# Patient Record
Sex: MALE | Race: WHITE | Employment: UNEMPLOYED | URBAN - METROPOLITAN AREA
[De-identification: names, ages, dates, MRNs, and addresses within clinical notes are randomized per-mention and may not be internally consistent; named-entity substitution may affect disease eponyms.]

---

## 2022-08-08 ENCOUNTER — OFFICE VISIT (OUTPATIENT)
Dept: SURGERY | Age: 27
End: 2022-08-08
Payer: COMMERCIAL

## 2022-08-08 VITALS
SYSTOLIC BLOOD PRESSURE: 126 MMHG | WEIGHT: 260 LBS | TEMPERATURE: 97.9 F | BODY MASS INDEX: 36.4 KG/M2 | RESPIRATION RATE: 18 BRPM | HEART RATE: 84 BPM | DIASTOLIC BLOOD PRESSURE: 82 MMHG | OXYGEN SATURATION: 98 % | HEIGHT: 71 IN

## 2022-08-08 DIAGNOSIS — K80.20 CALCULUS OF GALLBLADDER WITHOUT CHOLECYSTITIS WITHOUT OBSTRUCTION: Primary | ICD-10-CM

## 2022-08-08 PROCEDURE — 99204 OFFICE O/P NEW MOD 45 MIN: CPT | Performed by: COLON & RECTAL SURGERY

## 2022-08-08 RX ORDER — LISINOPRIL 10 MG/1
10 TABLET ORAL DAILY
COMMUNITY

## 2022-08-08 RX ORDER — PANTOPRAZOLE SODIUM 40 MG/1
40 TABLET, DELAYED RELEASE ORAL DAILY
COMMUNITY

## 2022-08-08 RX ORDER — ALBUTEROL SULFATE 90 UG/1
AEROSOL, METERED RESPIRATORY (INHALATION)
COMMUNITY

## 2022-08-08 RX ORDER — VENLAFAXINE 75 MG/1
75 TABLET ORAL 2 TIMES DAILY
COMMUNITY

## 2022-08-08 RX ORDER — GLECAPREVIR AND PIBRENTASVIR 40; 100 MG/1; MG/1
3 TABLET, FILM COATED ORAL
COMMUNITY

## 2022-08-08 NOTE — H&P (VIEW-ONLY)
OFFICE VISIT NOTE    Mel Sandoval is a 27 y.o. male who presents to the office today for:    Chief Complaint   Patient presents with    New Patient     Abdominal Pain        The patient is a 49-year-old gentleman who comes in today from [facility] where he is currently incarcerated with a complaint of right upper quadrant pain. He has had pain in the right upper quadrant for a couple of months which she describes as intermittent and associated with eating. Described as a dull ache which radiates to the back. Lasts anywhere from 20 to 30 minutes to a couple of hours. He did have an ultrasound recently which demonstrated cholelithiasis. His past medical history seen for history of hepatitis C for which she states he is penitentiary through with therapy. He also has a history of hypertension and GERD.       Past Medical History:   Diagnosis Date    GERD (gastroesophageal reflux disease)     Hypertension        Past Surgical History:   Procedure Laterality Date    HX TONSILLECTOMY         Family History   Family history unknown: Yes       Social History     Socioeconomic History    Marital status: UNKNOWN     Spouse name: Not on file    Number of children: Not on file    Years of education: Not on file    Highest education level: Not on file   Occupational History    Not on file   Tobacco Use    Smoking status: Former     Packs/day: 1.00     Years: 12.00     Pack years: 12.00     Types: Cigarettes    Smokeless tobacco: Not on file   Vaping Use    Vaping Use: Never used   Substance and Sexual Activity    Alcohol use: Not Currently    Drug use: Not Currently     Types: Heroin    Sexual activity: Not on file   Other Topics Concern    Not on file   Social History Narrative    Not on file     Social Determinants of Health     Financial Resource Strain: Not on file   Food Insecurity: Not on file Transportation Needs: Not on file   Physical Activity: Not on file   Stress: Not on file   Social Connections: Not on file   Intimate Partner Violence: Not on file   Housing Stability: Not on file       Allergies   Allergen Reactions    Ibuprofen Unknown (comments)    Rocephin [Ceftriaxone] Unknown (comments)       Current Outpatient Medications   Medication Sig    glecaprevir-pibrentasvir (Mavyret) 100-40 mg tab Take 3 Tablets by mouth nightly. pantoprazole (PROTONIX) 40 mg tablet Take 40 mg by mouth in the morning. venlafaxine (EFFEXOR) 75 mg tablet Take 75 mg by mouth two (2) times a day. lisinopriL (PRINIVIL, ZESTRIL) 10 mg tablet Take 10 mg by mouth in the morning. albuterol (PROVENTIL HFA, VENTOLIN HFA, PROAIR HFA) 90 mcg/actuation inhaler Take  by inhalation. No current facility-administered medications for this visit. Review of Systems   Constitutional: Negative. HENT: Negative. Eyes: Negative. Respiratory: Negative. Cardiovascular: Negative. Gastrointestinal:  Positive for abdominal pain. Genitourinary: Negative. Musculoskeletal: Negative. Skin: Negative. Neurological: Negative. Endo/Heme/Allergies: Negative. Psychiatric/Behavioral:  Positive for depression. /82 (BP 1 Location: Right arm, BP Patient Position: Sitting)   Pulse 84   Temp 97.9 °F (36.6 °C) (Temporal)   Resp 18   Ht 5' 11\" (1.803 m)   Wt 117.9 kg (260 lb)   SpO2 98%   BMI 36.26 kg/m²     Physical Exam  Constitutional:       General: He is not in acute distress. Appearance: He is obese. He is not ill-appearing. HENT:      Head: Normocephalic and atraumatic. Cardiovascular:      Rate and Rhythm: Normal rate and regular rhythm. Heart sounds: Normal heart sounds. Pulmonary:      Effort: Pulmonary effort is normal.      Breath sounds: Normal breath sounds. Abdominal:      General: There is no distension. Palpations: Abdomen is soft. There is no mass. Tenderness: There is abdominal tenderness (Mild tenderness right upper quadrant). Hernia: No hernia is present. Musculoskeletal:         General: Normal range of motion. Cervical back: Normal range of motion and neck supple. Skin:     General: Skin is warm and dry. Comments: Multiple tattoos   Neurological:      General: No focal deficit present. Mental Status: He is alert and oriented to person, place, and time. Psychiatric:         Mood and Affect: Mood normal.         Thought Content: Thought content normal.         Judgment: Judgment normal.       Problem List Items Addressed This Visit          Digestive    Calculus of gallbladder without cholecystitis without obstruction - Primary    Relevant Medications    pantoprazole (PROTONIX) 40 mg tablet       Assessment and Plan:  I had an extensive discussion with the patient and told him that the symptoms he is describing along with his physical exam and radiographic findings certainly suggest that his gallstone disease is responsible for his pain. I recommended a laparoscopic cholecystectomy and reviewed the procedure with him. I reviewed the risks and benefits. I reviewed the risk of infection, bleeding, wound complications, injury to other intra-abdominal organs and structures including the common bile duct, possible conversion to an open procedure. He wishes to proceed with surgery and his surgery has been scheduled for August 29, 2022.             Candance Bushy, MD

## 2022-08-08 NOTE — PROGRESS NOTES
Chief Complaint   Patient presents with    New Patient     Abdominal Pain      Visit Vitals  /82 (BP 1 Location: Right arm, BP Patient Position: Sitting)   Pulse 84   Temp 97.9 °F (36.6 °C) (Temporal)   Resp 18   Ht 5' 11\" (1.803 m)   Wt 260 lb (117.9 kg)   SpO2 98%   BMI 36.26 kg/m²

## 2022-08-08 NOTE — PROGRESS NOTES
OFFICE VISIT NOTE    Heather Page is a 27 y.o. male who presents to the office today for:    Chief Complaint   Patient presents with    New Patient     Abdominal Pain        The patient is a 57-year-old gentleman who [facility] with a complaint of right upper quadrant pain. He has had pain in the right upper quadrant for a couple of months which she describes as intermittent and associated with eating. Described as a dull ache which radiates to the back. Lasts anywhere from 20 to 30 minutes to a couple of hours. He did have an ultrasound recently which demonstrated cholelithiasis. His past medical history seen for history of hepatitis C for which she states he is intermediate through with therapy. He also has a history of hypertension and GERD.       Past Medical History:   Diagnosis Date    GERD (gastroesophageal reflux disease)     Hypertension        Past Surgical History:   Procedure Laterality Date    HX TONSILLECTOMY         Family History   Family history unknown: Yes       Social History     Socioeconomic History    Marital status: UNKNOWN     Spouse name: Not on file    Number of children: Not on file    Years of education: Not on file    Highest education level: Not on file   Occupational History    Not on file   Tobacco Use    Smoking status: Former     Packs/day: 1.00     Years: 12.00     Pack years: 12.00     Types: Cigarettes    Smokeless tobacco: Not on file   Vaping Use    Vaping Use: Never used   Substance and Sexual Activity    Alcohol use: Not Currently    Drug use: Not Currently     Types: Heroin    Sexual activity: Not on file   Other Topics Concern    Not on file   Social History Narrative    Not on file     Social Determinants of Health     Financial Resource Strain: Not on file   Food Insecurity: Not on file Transportation Needs: Not on file   Physical Activity: Not on file   Stress: Not on file   Social Connections: Not on file   Intimate Partner Violence: Not on file   Housing Stability: Not on file       Allergies   Allergen Reactions    Ibuprofen Unknown (comments)    Rocephin [Ceftriaxone] Unknown (comments)       Current Outpatient Medications   Medication Sig    glecaprevir-pibrentasvir (Mavyret) 100-40 mg tab Take 3 Tablets by mouth nightly. pantoprazole (PROTONIX) 40 mg tablet Take 40 mg by mouth in the morning. venlafaxine (EFFEXOR) 75 mg tablet Take 75 mg by mouth two (2) times a day. lisinopriL (PRINIVIL, ZESTRIL) 10 mg tablet Take 10 mg by mouth in the morning. albuterol (PROVENTIL HFA, VENTOLIN HFA, PROAIR HFA) 90 mcg/actuation inhaler Take  by inhalation. No current facility-administered medications for this visit. Review of Systems   Constitutional: Negative. HENT: Negative. Eyes: Negative. Respiratory: Negative. Cardiovascular: Negative. Gastrointestinal:  Positive for abdominal pain. Genitourinary: Negative. Musculoskeletal: Negative. Skin: Negative. Neurological: Negative. Endo/Heme/Allergies: Negative. Psychiatric/Behavioral:  Positive for depression. /82 (BP 1 Location: Right arm, BP Patient Position: Sitting)   Pulse 84   Temp 97.9 °F (36.6 °C) (Temporal)   Resp 18   Ht 5' 11\" (1.803 m)   Wt 117.9 kg (260 lb)   SpO2 98%   BMI 36.26 kg/m²     Physical Exam  Constitutional:       General: He is not in acute distress. Appearance: He is obese. He is not ill-appearing. HENT:      Head: Normocephalic and atraumatic. Cardiovascular:      Rate and Rhythm: Normal rate and regular rhythm. Heart sounds: Normal heart sounds. Pulmonary:      Effort: Pulmonary effort is normal.      Breath sounds: Normal breath sounds. Abdominal:      General: There is no distension. Palpations: Abdomen is soft. There is no mass. Tenderness: There is abdominal tenderness (Mild tenderness right upper quadrant). Hernia: No hernia is present. Musculoskeletal:         General: Normal range of motion. Cervical back: Normal range of motion and neck supple. Skin:     General: Skin is warm and dry. Comments: Multiple tattoos   Neurological:      General: No focal deficit present. Mental Status: He is alert and oriented to person, place, and time. Psychiatric:         Mood and Affect: Mood normal.         Thought Content: Thought content normal.         Judgment: Judgment normal.       Problem List Items Addressed This Visit          Digestive    Calculus of gallbladder without cholecystitis without obstruction - Primary    Relevant Medications    pantoprazole (PROTONIX) 40 mg tablet       Assessment and Plan:  I had an extensive discussion with the patient and told him that the symptoms he is describing along with his physical exam and radiographic findings certainly suggest that his gallstone disease is responsible for his pain. I recommended a laparoscopic cholecystectomy and reviewed the procedure with him. I reviewed the risks and benefits. I reviewed the risk of infection, bleeding, wound complications, injury to other intra-abdominal organs and structures including the common bile duct, possible conversion to an open procedure. He wishes to proceed with surgery and his surgery has been scheduled for August 29, 2022.             Rashad De León MD

## 2022-08-29 ENCOUNTER — ANESTHESIA (OUTPATIENT)
Dept: SURGERY | Age: 27
End: 2022-08-29
Payer: COMMERCIAL

## 2022-08-29 ENCOUNTER — HOSPITAL ENCOUNTER (OUTPATIENT)
Age: 27
Setting detail: OUTPATIENT SURGERY
Discharge: HOME | End: 2022-08-29
Attending: COLON & RECTAL SURGERY | Admitting: COLON & RECTAL SURGERY
Payer: COMMERCIAL

## 2022-08-29 ENCOUNTER — ANESTHESIA EVENT (OUTPATIENT)
Dept: SURGERY | Age: 27
End: 2022-08-29
Payer: COMMERCIAL

## 2022-08-29 VITALS
HEIGHT: 71 IN | BODY MASS INDEX: 36.29 KG/M2 | WEIGHT: 259.2 LBS | SYSTOLIC BLOOD PRESSURE: 143 MMHG | DIASTOLIC BLOOD PRESSURE: 80 MMHG | HEART RATE: 60 BPM | OXYGEN SATURATION: 100 % | RESPIRATION RATE: 18 BRPM | TEMPERATURE: 97.4 F

## 2022-08-29 PROCEDURE — 77030031139 HC SUT VCRL2 J&J -A: Performed by: COLON & RECTAL SURGERY

## 2022-08-29 PROCEDURE — 74011250637 HC RX REV CODE- 250/637: Performed by: COLON & RECTAL SURGERY

## 2022-08-29 PROCEDURE — 76060000033 HC ANESTHESIA 1 TO 1.5 HR: Performed by: COLON & RECTAL SURGERY

## 2022-08-29 PROCEDURE — 77030018813 HC SCIS LAPSCP EPIX DISP AMR -B: Performed by: COLON & RECTAL SURGERY

## 2022-08-29 PROCEDURE — 77030008606 HC TRCR ENDOSC KII AMR -B: Performed by: COLON & RECTAL SURGERY

## 2022-08-29 PROCEDURE — 47562 LAPAROSCOPIC CHOLECYSTECTOMY: CPT | Performed by: COLON & RECTAL SURGERY

## 2022-08-29 PROCEDURE — 77030010507 HC ADH SKN DERMBND J&J -B: Performed by: COLON & RECTAL SURGERY

## 2022-08-29 PROCEDURE — 88304 TISSUE EXAM BY PATHOLOGIST: CPT

## 2022-08-29 PROCEDURE — 74011000258 HC RX REV CODE- 258: Performed by: NURSE ANESTHETIST, CERTIFIED REGISTERED

## 2022-08-29 PROCEDURE — 74011250636 HC RX REV CODE- 250/636: Performed by: COLON & RECTAL SURGERY

## 2022-08-29 PROCEDURE — 74011000250 HC RX REV CODE- 250: Performed by: NURSE ANESTHETIST, CERTIFIED REGISTERED

## 2022-08-29 PROCEDURE — 77030012799 HC TRCR GELPRT BLN AMR -B: Performed by: COLON & RECTAL SURGERY

## 2022-08-29 PROCEDURE — 76210000026 HC REC RM PH II 1 TO 1.5 HR: Performed by: COLON & RECTAL SURGERY

## 2022-08-29 PROCEDURE — 76010000149 HC OR TIME 1 TO 1.5 HR: Performed by: COLON & RECTAL SURGERY

## 2022-08-29 PROCEDURE — 76210000063 HC OR PH I REC FIRST 0.5 HR: Performed by: COLON & RECTAL SURGERY

## 2022-08-29 PROCEDURE — 74011000250 HC RX REV CODE- 250: Performed by: COLON & RECTAL SURGERY

## 2022-08-29 PROCEDURE — 2709999900 HC NON-CHARGEABLE SUPPLY: Performed by: COLON & RECTAL SURGERY

## 2022-08-29 PROCEDURE — 77030041746: Performed by: COLON & RECTAL SURGERY

## 2022-08-29 PROCEDURE — 77030016151 HC PROTCTR LNS DFOG COVD -B: Performed by: COLON & RECTAL SURGERY

## 2022-08-29 PROCEDURE — 77030010513 HC APPL CLP LIG J&J -C: Performed by: COLON & RECTAL SURGERY

## 2022-08-29 PROCEDURE — 77030020747 HC TU INSUF ENDOSC TELE -A: Performed by: COLON & RECTAL SURGERY

## 2022-08-29 PROCEDURE — 77030018684: Performed by: COLON & RECTAL SURGERY

## 2022-08-29 PROCEDURE — 74011250636 HC RX REV CODE- 250/636: Performed by: NURSE ANESTHETIST, CERTIFIED REGISTERED

## 2022-08-29 PROCEDURE — 77030002933 HC SUT MCRYL J&J -A: Performed by: COLON & RECTAL SURGERY

## 2022-08-29 PROCEDURE — 77030007955 HC PCH ENDOSC SPEC J&J -B: Performed by: COLON & RECTAL SURGERY

## 2022-08-29 RX ORDER — ROCURONIUM BROMIDE 10 MG/ML
INJECTION, SOLUTION INTRAVENOUS AS NEEDED
Status: DISCONTINUED | OUTPATIENT
Start: 2022-08-29 | End: 2022-08-29 | Stop reason: HOSPADM

## 2022-08-29 RX ORDER — MIDAZOLAM HYDROCHLORIDE 1 MG/ML
INJECTION, SOLUTION INTRAMUSCULAR; INTRAVENOUS AS NEEDED
Status: DISCONTINUED | OUTPATIENT
Start: 2022-08-29 | End: 2022-08-29 | Stop reason: HOSPADM

## 2022-08-29 RX ORDER — FENTANYL CITRATE 50 UG/ML
INJECTION, SOLUTION INTRAMUSCULAR; INTRAVENOUS AS NEEDED
Status: DISCONTINUED | OUTPATIENT
Start: 2022-08-29 | End: 2022-08-29 | Stop reason: HOSPADM

## 2022-08-29 RX ORDER — KETAMINE HYDROCHLORIDE 10 MG/ML
INJECTION, SOLUTION INTRAMUSCULAR; INTRAVENOUS AS NEEDED
Status: DISCONTINUED | OUTPATIENT
Start: 2022-08-29 | End: 2022-08-29 | Stop reason: HOSPADM

## 2022-08-29 RX ORDER — SUCCINYLCHOLINE CHLORIDE 20 MG/ML
INJECTION INTRAMUSCULAR; INTRAVENOUS AS NEEDED
Status: DISCONTINUED | OUTPATIENT
Start: 2022-08-29 | End: 2022-08-29 | Stop reason: HOSPADM

## 2022-08-29 RX ORDER — OXYCODONE AND ACETAMINOPHEN 5; 325 MG/1; MG/1
2 TABLET ORAL
Status: DISCONTINUED | OUTPATIENT
Start: 2022-08-29 | End: 2022-08-29 | Stop reason: HOSPADM

## 2022-08-29 RX ORDER — OXYCODONE AND ACETAMINOPHEN 5; 325 MG/1; MG/1
2 TABLET ORAL
Status: DISCONTINUED | OUTPATIENT
Start: 2022-08-29 | End: 2022-08-29

## 2022-08-29 RX ORDER — ONDANSETRON 2 MG/ML
INJECTION INTRAMUSCULAR; INTRAVENOUS AS NEEDED
Status: DISCONTINUED | OUTPATIENT
Start: 2022-08-29 | End: 2022-08-29 | Stop reason: HOSPADM

## 2022-08-29 RX ORDER — BUPIVACAINE HYDROCHLORIDE AND EPINEPHRINE 5; 5 MG/ML; UG/ML
INJECTION, SOLUTION EPIDURAL; INTRACAUDAL; PERINEURAL
Status: DISCONTINUED
Start: 2022-08-29 | End: 2022-08-29 | Stop reason: HOSPADM

## 2022-08-29 RX ORDER — PROPOFOL 10 MG/ML
INJECTION, EMULSION INTRAVENOUS AS NEEDED
Status: DISCONTINUED | OUTPATIENT
Start: 2022-08-29 | End: 2022-08-29 | Stop reason: HOSPADM

## 2022-08-29 RX ORDER — BUPIVACAINE HYDROCHLORIDE AND EPINEPHRINE 5; 5 MG/ML; UG/ML
INJECTION, SOLUTION EPIDURAL; INTRACAUDAL; PERINEURAL AS NEEDED
Status: DISCONTINUED | OUTPATIENT
Start: 2022-08-29 | End: 2022-08-29 | Stop reason: HOSPADM

## 2022-08-29 RX ORDER — SODIUM CHLORIDE 9 MG/ML
100 INJECTION, SOLUTION INTRAVENOUS CONTINUOUS
Status: DISCONTINUED | OUTPATIENT
Start: 2022-08-29 | End: 2022-08-29 | Stop reason: HOSPADM

## 2022-08-29 RX ORDER — SODIUM CHLORIDE, SODIUM LACTATE, POTASSIUM CHLORIDE, CALCIUM CHLORIDE 600; 310; 30; 20 MG/100ML; MG/100ML; MG/100ML; MG/100ML
INJECTION, SOLUTION INTRAVENOUS
Status: DISCONTINUED | OUTPATIENT
Start: 2022-08-29 | End: 2022-08-29 | Stop reason: HOSPADM

## 2022-08-29 RX ORDER — CLINDAMYCIN PHOSPHATE 900 MG/50ML
900 INJECTION INTRAVENOUS ONCE
Status: COMPLETED | OUTPATIENT
Start: 2022-08-29 | End: 2022-08-29

## 2022-08-29 RX ORDER — SODIUM CHLORIDE 9 MG/ML
INJECTION, SOLUTION INTRAVENOUS
Status: DISCONTINUED | OUTPATIENT
Start: 2022-08-29 | End: 2022-08-29 | Stop reason: HOSPADM

## 2022-08-29 RX ADMIN — Medication 20 MG: at 13:41

## 2022-08-29 RX ADMIN — ONDANSETRON 4 MG: 2 INJECTION INTRAMUSCULAR; INTRAVENOUS at 13:20

## 2022-08-29 RX ADMIN — OXYCODONE AND ACETAMINOPHEN 2 TABLET: 5; 325 TABLET ORAL at 15:40

## 2022-08-29 RX ADMIN — FENTANYL CITRATE 100 MCG: 50 INJECTION, SOLUTION INTRAMUSCULAR; INTRAVENOUS at 13:20

## 2022-08-29 RX ADMIN — DEXMEDETOMIDINE HYDROCHLORIDE 20 MCG: 100 INJECTION, SOLUTION, CONCENTRATE INTRAVENOUS at 13:31

## 2022-08-29 RX ADMIN — SODIUM CHLORIDE, POTASSIUM CHLORIDE, SODIUM LACTATE AND CALCIUM CHLORIDE: 600; 310; 30; 20 INJECTION, SOLUTION INTRAVENOUS at 14:08

## 2022-08-29 RX ADMIN — SODIUM CHLORIDE: 9 INJECTION, SOLUTION INTRAVENOUS at 13:05

## 2022-08-29 RX ADMIN — PROPOFOL 200 MG: 10 INJECTION, EMULSION INTRAVENOUS at 13:20

## 2022-08-29 RX ADMIN — CLINDAMYCIN IN 5 PERCENT DEXTROSE 900 MG: 18 INJECTION, SOLUTION INTRAVENOUS at 13:15

## 2022-08-29 RX ADMIN — SUGAMMADEX 200 MG: 100 INJECTION, SOLUTION INTRAVENOUS at 14:20

## 2022-08-29 RX ADMIN — SUCCINYLCHOLINE CHLORIDE 180 MG: 20 INJECTION, SOLUTION INTRAMUSCULAR; INTRAVENOUS at 13:20

## 2022-08-29 RX ADMIN — ROCURONIUM BROMIDE 50 MG: 10 INJECTION, SOLUTION INTRAVENOUS at 13:26

## 2022-08-29 RX ADMIN — DEXMEDETOMIDINE HYDROCHLORIDE 20 MCG: 100 INJECTION, SOLUTION, CONCENTRATE INTRAVENOUS at 13:23

## 2022-08-29 RX ADMIN — MIDAZOLAM 2 MG: 1 INJECTION INTRAMUSCULAR; INTRAVENOUS at 13:13

## 2022-08-29 RX ADMIN — Medication 30 MG: at 13:38

## 2022-08-29 RX ADMIN — SODIUM CHLORIDE 100 ML/HR: 9 INJECTION, SOLUTION INTRAVENOUS at 10:24

## 2022-08-29 RX ADMIN — EPHEDRINE SULFATE 1000 MCG: 50 INJECTION INTRAVENOUS at 13:48

## 2022-08-29 NOTE — INTERVAL H&P NOTE
Update History & Physical    The Patient's History and Physical of August 8, 2022 was reviewed with the patient and I examined the patient. There was no change. The surgical site was confirmed by the patient and me. Plan:  The risk, benefits, expected outcome, and alternative to the recommended procedure have been discussed with the patient. Patient understands and wants to proceed with the procedure.     Electronically signed by Tresa English MD on 8/29/2022 at 9:00 AM

## 2022-08-29 NOTE — ANESTHESIA PREPROCEDURE EVALUATION
Relevant Problems   No relevant active problems       Anesthetic History   No history of anesthetic complications            Review of Systems / Medical History  Patient summary reviewed, nursing notes reviewed and pertinent labs reviewed    Pulmonary            Asthma        Neuro/Psych   Within defined limits           Cardiovascular    Hypertension                   GI/Hepatic/Renal     GERD: well controlled           Endo/Other        Obesity     Other Findings              Physical Exam    Airway  Mallampati: II  TM Distance: 4 - 6 cm  Neck ROM: normal range of motion        Cardiovascular    Rhythm: regular           Dental         Pulmonary  Breath sounds clear to auscultation               Abdominal  Abdominal exam normal       Other Findings            Anesthetic Plan    ASA: 3  Anesthesia type: general          Induction: Intravenous  Anesthetic plan and risks discussed with: Patient

## 2022-08-29 NOTE — DISCHARGE INSTRUCTIONS
No lifting greater than 10 pounds, no strenuous activity for 6 weeks  May shower in 2 days no tub baths

## 2022-08-29 NOTE — OP NOTES
Operative Note    Patient: Danny Ortiz  YOB: 1995  MRN: [id number]    Date of Procedure: 8/29/2022     Pre-Op Diagnosis: CHOLELITHIASIS    Post-Op Diagnosis: Same as preoperative diagnosis. Procedure(s):  LAPAROSCOPIC CHOLECYSTECTOMY    Surgeon(s):  Vincent Bradford MD    Surgical Assistant: Surg Asst-1: Tara Hill    Anesthesia: General     Estimated Blood Loss (mL):  Minimal    Complications: None    Specimens:   ID Type Source Tests Collected by Time Destination   1 : Gallbladder and contents Preservative Gallbladder  Vincent Bradford MD 8/29/2022 1358 Pathology        Implants: * No implants in log *    Drains: * No LDAs found *    Findings: Distended gallbladder with omental adhesions and some fibrosis in the liver bed consistent with prior episodes of cholecystitis    Detailed Description of Procedure: The patient was brought to the operating room position on the OR table in the supine position. Following the induction of general endotracheal anesthesia the abdomen was prepped and draped in standard sterile fashion. A surgical timeout was performed at which time the patient's identity and surgical procedure once again confirmed. A small supraumbilical incision was made and taken down through the subcutaneous tissues with electrocautery. The fascia was grasped and elevated between 2 Kocher clamps and sharply incised with a scalpel. A Briana clamp was used to bluntly pass into the abdominal cavity and 0 Vicryl sutures were placed on either side the fascial opening. The Lanelle Palacio trocar was then introduced and secured in place and the abdomen insufflated to pressure 15 mmHg. Laparoscope was introduced and under direct utilization after infiltrating with 0.5% Marcaine with epinephrine two 5 mm right upper quadrant ports and one 5 mm subxiphoid port were inserted. The patient was placed in reverse Trendelenburg position with the left side down.   A grasper was placed on the fundus the gallbladder was retracted upward. Adhesions from the omentum to the gallbladder were taken down sharply with the Metzenbaum scissors. A second grasper was placed in the infundibulum and the triangle of Haja carefully dissected out. Once the cystic duct and artery had been fully skeletonized the liver bed. Doubly clipped distal to the gallbladder and singly on the gallbladder side and divided. The gallbladder was taken off the liver bed using the L-hook cautery. At the conclusion the operative field was thoroughly irrigated out and inspected for meticulous hemostasis. After ensuring meticulous hemostasis the laparoscope was withdrawn and a 5 mm laparoscope placed through the subxiphoid port site. And Endo Catch bag was placed in umbilical port site and the gallbladder placed within it and withdrawn to umbilical port site. At this point all ports were withdrawn and the abdomen allowed to desufflate. The fascial umbilical port site was closed with 0 Vicryl sutures. Additional 0.5% Marcaine with epinephrine was infiltrated at the umbilical port site. All skin incisions were closed with a 4-0 Monocryl subcuticular suture. Dermabond dressings were applied and the procedure terminated. The patient awakened, extubated, taken recovery in stable condition. All counts were correct at the close the case.     Electronically Signed by Jose Ramon Han MD on 8/29/2022 at 2:24 PM

## 2022-08-29 NOTE — PROGRESS NOTES
Report given to GONZALO Laguna at [facility] about the Vs, condition of dressings and last time pain med was given.

## 2023-02-24 ENCOUNTER — HOSPITAL ENCOUNTER (INPATIENT)
Age: 28
LOS: 5 days | Discharge: HOME | End: 2023-03-01
Attending: STUDENT IN AN ORGANIZED HEALTH CARE EDUCATION/TRAINING PROGRAM | Admitting: INTERNAL MEDICINE
Payer: MEDICAID

## 2023-02-24 DIAGNOSIS — K75.9 HEPATITIS: Primary | ICD-10-CM

## 2023-02-24 LAB
ALBUMIN SERPL-MCNC: 3.8 G/DL (ref 3.5–5)
ALBUMIN/GLOB SERPL: 0.8 (ref 1.1–2.2)
ALP SERPL-CCNC: 140 U/L (ref 45–117)
ALT SERPL-CCNC: 836 U/L (ref 12–78)
ANION GAP SERPL CALC-SCNC: 5 MMOL/L (ref 5–15)
APPEARANCE UR: CLEAR
AST SERPL W P-5'-P-CCNC: 860 U/L (ref 15–37)
BACTERIA URNS QL MICRO: NEGATIVE /HPF
BASOPHILS # BLD: 0 K/UL (ref 0–0.1)
BASOPHILS NFR BLD: 0 % (ref 0–1)
BILIRUB DIRECT SERPL-MCNC: 4.1 MG/DL (ref 0–0.2)
BILIRUB SERPL-MCNC: 5.7 MG/DL (ref 0.2–1)
BILIRUB UR QL CFM: POSITIVE
BILIRUB UR QL: ABNORMAL
BUN SERPL-MCNC: 9 MG/DL (ref 6–20)
BUN/CREAT SERPL: 9 (ref 12–20)
CA-I BLD-MCNC: 9.4 MG/DL (ref 8.5–10.1)
CHLORIDE SERPL-SCNC: 100 MMOL/L (ref 97–108)
CO2 SERPL-SCNC: 24 MMOL/L (ref 21–32)
COLOR UR: ABNORMAL
CREAT SERPL-MCNC: 1.04 MG/DL (ref 0.7–1.3)
DIFFERENTIAL METHOD BLD: ABNORMAL
EOSINOPHIL # BLD: 0 K/UL (ref 0–0.4)
EOSINOPHIL NFR BLD: 0 % (ref 0–7)
EPITH CASTS URNS QL MICRO: ABNORMAL /LPF
ERYTHROCYTE [DISTWIDTH] IN BLOOD BY AUTOMATED COUNT: 12.4 % (ref 11.5–14.5)
GLOBULIN SER CALC-MCNC: 4.6 G/DL (ref 2–4)
GLUCOSE SERPL-MCNC: 92 MG/DL (ref 65–100)
GLUCOSE UR STRIP.AUTO-MCNC: NEGATIVE MG/DL
HCT VFR BLD AUTO: 37.6 % (ref 36.6–50.3)
HGB BLD-MCNC: 13.1 G/DL (ref 12.1–17)
HGB UR QL STRIP: NEGATIVE
IMM GRANULOCYTES # BLD AUTO: 0 K/UL
IMM GRANULOCYTES NFR BLD AUTO: 0 %
KETONES UR QL STRIP.AUTO: NEGATIVE MG/DL
LEUKOCYTE ESTERASE UR QL STRIP.AUTO: NEGATIVE
LIPASE SERPL-CCNC: 75 U/L (ref 73–393)
LYMPHOCYTES # BLD: 1.7 K/UL (ref 0.8–3.5)
LYMPHOCYTES NFR BLD: 43 % (ref 12–49)
MCH RBC QN AUTO: 28.6 PG (ref 26–34)
MCHC RBC AUTO-ENTMCNC: 34.8 G/DL (ref 30–36.5)
MCV RBC AUTO: 82.1 FL (ref 80–99)
MONOCYTES # BLD: 0.2 K/UL (ref 0–1)
MONOCYTES NFR BLD: 6 % (ref 5–13)
NEUTS SEG # BLD: 2 K/UL (ref 1.8–8)
NEUTS SEG NFR BLD: 51 % (ref 32–75)
NITRITE UR QL STRIP.AUTO: NEGATIVE
NRBC # BLD: 0 K/UL (ref 0–0.01)
NRBC BLD-RTO: 0 PER 100 WBC
PH UR STRIP: 6 (ref 5–8)
PLATELET # BLD AUTO: 170 K/UL (ref 150–400)
PMV BLD AUTO: 10.5 FL (ref 8.9–12.9)
POTASSIUM SERPL-SCNC: 4.2 MMOL/L (ref 3.5–5.1)
PROT SERPL-MCNC: 8.4 G/DL (ref 6.4–8.2)
PROT UR STRIP-MCNC: NEGATIVE MG/DL
RBC # BLD AUTO: 4.58 M/UL (ref 4.1–5.7)
RBC #/AREA URNS HPF: ABNORMAL /HPF (ref 0–5)
RBC MORPH BLD: ABNORMAL
SODIUM SERPL-SCNC: 129 MMOL/L (ref 136–145)
SP GR UR REFRACTOMETRY: 1.01 (ref 1–1.03)
UROBILINOGEN UR QL STRIP.AUTO: 4 EU/DL (ref 0.1–1)
WBC # BLD AUTO: 3.9 K/UL (ref 4.1–11.1)
WBC URNS QL MICRO: ABNORMAL /HPF (ref 0–4)

## 2023-02-24 PROCEDURE — 65270000029 HC RM PRIVATE

## 2023-02-24 PROCEDURE — 80048 BASIC METABOLIC PNL TOTAL CA: CPT

## 2023-02-24 PROCEDURE — 74011250637 HC RX REV CODE- 250/637: Performed by: INTERNAL MEDICINE

## 2023-02-24 PROCEDURE — 83690 ASSAY OF LIPASE: CPT

## 2023-02-24 PROCEDURE — 74011250636 HC RX REV CODE- 250/636: Performed by: STUDENT IN AN ORGANIZED HEALTH CARE EDUCATION/TRAINING PROGRAM

## 2023-02-24 PROCEDURE — 80076 HEPATIC FUNCTION PANEL: CPT

## 2023-02-24 PROCEDURE — 36415 COLL VENOUS BLD VENIPUNCTURE: CPT

## 2023-02-24 PROCEDURE — 99285 EMERGENCY DEPT VISIT HI MDM: CPT

## 2023-02-24 PROCEDURE — 85025 COMPLETE CBC W/AUTO DIFF WBC: CPT

## 2023-02-24 PROCEDURE — 96374 THER/PROPH/DIAG INJ IV PUSH: CPT

## 2023-02-24 PROCEDURE — 81003 URINALYSIS AUTO W/O SCOPE: CPT

## 2023-02-24 RX ORDER — ONDANSETRON 2 MG/ML
4 INJECTION INTRAMUSCULAR; INTRAVENOUS
Status: DISCONTINUED | OUTPATIENT
Start: 2023-02-24 | End: 2023-03-01 | Stop reason: HOSPADM

## 2023-02-24 RX ORDER — SODIUM CHLORIDE 0.9 % (FLUSH) 0.9 %
5-40 SYRINGE (ML) INJECTION AS NEEDED
Status: DISCONTINUED | OUTPATIENT
Start: 2023-02-24 | End: 2023-03-01 | Stop reason: HOSPADM

## 2023-02-24 RX ORDER — ENOXAPARIN SODIUM 100 MG/ML
30 INJECTION SUBCUTANEOUS EVERY 12 HOURS
Status: DISCONTINUED | OUTPATIENT
Start: 2023-02-24 | End: 2023-03-01 | Stop reason: HOSPADM

## 2023-02-24 RX ORDER — ONDANSETRON 2 MG/ML
4 INJECTION INTRAMUSCULAR; INTRAVENOUS
Status: COMPLETED | OUTPATIENT
Start: 2023-02-24 | End: 2023-02-24

## 2023-02-24 RX ORDER — PANTOPRAZOLE SODIUM 40 MG/1
40 TABLET, DELAYED RELEASE ORAL DAILY
Status: DISCONTINUED | OUTPATIENT
Start: 2023-02-25 | End: 2023-03-01 | Stop reason: HOSPADM

## 2023-02-24 RX ORDER — ONDANSETRON 4 MG/1
4 TABLET, ORALLY DISINTEGRATING ORAL
Status: DISCONTINUED | OUTPATIENT
Start: 2023-02-24 | End: 2023-03-01 | Stop reason: HOSPADM

## 2023-02-24 RX ORDER — VENLAFAXINE 37.5 MG/1
75 TABLET ORAL 2 TIMES DAILY
Status: DISCONTINUED | OUTPATIENT
Start: 2023-02-24 | End: 2023-03-01 | Stop reason: HOSPADM

## 2023-02-24 RX ORDER — POLYETHYLENE GLYCOL 3350 17 G/17G
17 POWDER, FOR SOLUTION ORAL DAILY PRN
Status: DISCONTINUED | OUTPATIENT
Start: 2023-02-24 | End: 2023-03-01 | Stop reason: HOSPADM

## 2023-02-24 RX ORDER — SODIUM CHLORIDE 0.9 % (FLUSH) 0.9 %
5-40 SYRINGE (ML) INJECTION EVERY 8 HOURS
Status: DISCONTINUED | OUTPATIENT
Start: 2023-02-24 | End: 2023-03-01 | Stop reason: HOSPADM

## 2023-02-24 RX ORDER — TRAMADOL HYDROCHLORIDE 50 MG/1
50 TABLET ORAL
Status: DISCONTINUED | OUTPATIENT
Start: 2023-02-24 | End: 2023-02-26

## 2023-02-24 RX ORDER — LISINOPRIL 10 MG/1
10 TABLET ORAL DAILY
Status: DISCONTINUED | OUTPATIENT
Start: 2023-02-25 | End: 2023-03-01 | Stop reason: HOSPADM

## 2023-02-24 RX ADMIN — TRAMADOL HYDROCHLORIDE 50 MG: 50 TABLET, COATED ORAL at 21:36

## 2023-02-24 RX ADMIN — SODIUM CHLORIDE 1000 ML: 9 INJECTION, SOLUTION INTRAVENOUS at 19:00

## 2023-02-24 RX ADMIN — ONDANSETRON 4 MG: 2 INJECTION INTRAMUSCULAR; INTRAVENOUS at 19:01

## 2023-02-25 PROBLEM — R17 JAUNDICE: Status: ACTIVE | Noted: 2023-02-25

## 2023-02-25 LAB
ALBUMIN SERPL-MCNC: 3.3 G/DL (ref 3.5–5)
ALBUMIN/GLOB SERPL: 0.9 (ref 1.1–2.2)
ALP SERPL-CCNC: 119 U/L (ref 45–117)
ALT SERPL-CCNC: 742 U/L (ref 12–78)
ANION GAP SERPL CALC-SCNC: 5 MMOL/L (ref 5–15)
APAP SERPL-MCNC: <10 UG/ML (ref 10–30)
AST SERPL W P-5'-P-CCNC: 683 U/L (ref 15–37)
BILIRUB SERPL-MCNC: 4.4 MG/DL (ref 0.2–1)
BUN SERPL-MCNC: 8 MG/DL (ref 6–20)
BUN/CREAT SERPL: 8 (ref 12–20)
CA-I BLD-MCNC: 8.9 MG/DL (ref 8.5–10.1)
CHLORIDE SERPL-SCNC: 101 MMOL/L (ref 97–108)
CO2 SERPL-SCNC: 26 MMOL/L (ref 21–32)
CREAT SERPL-MCNC: 0.96 MG/DL (ref 0.7–1.3)
DATE LAST DOSE: ABNORMAL
ERYTHROCYTE [DISTWIDTH] IN BLOOD BY AUTOMATED COUNT: 12.6 % (ref 11.5–14.5)
FERRITIN SERPL-MCNC: 448 NG/ML (ref 26–388)
GLOBULIN SER CALC-MCNC: 3.7 G/DL (ref 2–4)
GLUCOSE SERPL-MCNC: 116 MG/DL (ref 65–100)
HAV IGM SER QL: NONREACTIVE
HBV CORE IGM SER QL: NONREACTIVE
HBV SURFACE AG SER QL: <0.1 INDEX
HBV SURFACE AG SER QL: NEGATIVE
HCT VFR BLD AUTO: 33.9 % (ref 36.6–50.3)
HCV AB SER IA-ACNC: >11 INDEX
HCV AB SERPL QL IA: REACTIVE
HGB BLD-MCNC: 11.7 G/DL (ref 12.1–17)
HIV1 P24 AG SERPL QL IA: NONREACTIVE
HIV1+2 AB SERPL QL IA: NONREACTIVE
INR PPP: 1.1 (ref 0.9–1.1)
IRON SATN MFR SERPL: 16 % (ref 20–50)
IRON SERPL-MCNC: 45 UG/DL (ref 35–150)
MCH RBC QN AUTO: 28.7 PG (ref 26–34)
MCHC RBC AUTO-ENTMCNC: 34.5 G/DL (ref 30–36.5)
MCV RBC AUTO: 83.1 FL (ref 80–99)
NRBC # BLD: 0 K/UL (ref 0–0.01)
NRBC BLD-RTO: 0 PER 100 WBC
PLATELET # BLD AUTO: 158 K/UL (ref 150–400)
PMV BLD AUTO: 10.3 FL (ref 8.9–12.9)
POTASSIUM SERPL-SCNC: 3.5 MMOL/L (ref 3.5–5.1)
PROT SERPL-MCNC: 7 G/DL (ref 6.4–8.2)
PROTHROMBIN TIME: 14.2 SEC (ref 11.9–14.6)
RBC # BLD AUTO: 4.08 M/UL (ref 4.1–5.7)
SALICYLATES SERPL-MCNC: <1.7 MG/DL (ref 2.8–20)
SODIUM SERPL-SCNC: 132 MMOL/L (ref 136–145)
SP1: ABNORMAL
SP2: ABNORMAL
SP3: ABNORMAL
TIBC SERPL-MCNC: 286 UG/DL (ref 250–450)
WBC # BLD AUTO: 3.5 K/UL (ref 4.1–11.1)

## 2023-02-25 PROCEDURE — 82728 ASSAY OF FERRITIN: CPT

## 2023-02-25 PROCEDURE — 80053 COMPREHEN METABOLIC PANEL: CPT

## 2023-02-25 PROCEDURE — 80074 ACUTE HEPATITIS PANEL: CPT

## 2023-02-25 PROCEDURE — 87389 HIV-1 AG W/HIV-1&-2 AB AG IA: CPT

## 2023-02-25 PROCEDURE — 74011000250 HC RX REV CODE- 250: Performed by: INTERNAL MEDICINE

## 2023-02-25 PROCEDURE — 86038 ANTINUCLEAR ANTIBODIES: CPT

## 2023-02-25 PROCEDURE — 36415 COLL VENOUS BLD VENIPUNCTURE: CPT

## 2023-02-25 PROCEDURE — 74011250636 HC RX REV CODE- 250/636: Performed by: NURSE PRACTITIONER

## 2023-02-25 PROCEDURE — 85027 COMPLETE CBC AUTOMATED: CPT

## 2023-02-25 PROCEDURE — 85610 PROTHROMBIN TIME: CPT

## 2023-02-25 PROCEDURE — 65270000029 HC RM PRIVATE

## 2023-02-25 PROCEDURE — 74011250636 HC RX REV CODE- 250/636: Performed by: INTERNAL MEDICINE

## 2023-02-25 PROCEDURE — 83540 ASSAY OF IRON: CPT

## 2023-02-25 PROCEDURE — 80179 DRUG ASSAY SALICYLATE: CPT

## 2023-02-25 PROCEDURE — 80143 DRUG ASSAY ACETAMINOPHEN: CPT

## 2023-02-25 PROCEDURE — 74011250637 HC RX REV CODE- 250/637: Performed by: INTERNAL MEDICINE

## 2023-02-25 RX ORDER — METRONIDAZOLE 500 MG/100ML
500 INJECTION, SOLUTION INTRAVENOUS EVERY 12 HOURS
Status: COMPLETED | OUTPATIENT
Start: 2023-02-25 | End: 2023-02-26

## 2023-02-25 RX ORDER — SODIUM CHLORIDE 9 MG/ML
75 INJECTION, SOLUTION INTRAVENOUS CONTINUOUS
Status: DISCONTINUED | OUTPATIENT
Start: 2023-02-25 | End: 2023-03-01 | Stop reason: HOSPADM

## 2023-02-25 RX ORDER — FENTANYL CITRATE 50 UG/ML
25 INJECTION, SOLUTION INTRAMUSCULAR; INTRAVENOUS
Status: DISCONTINUED | OUTPATIENT
Start: 2023-02-25 | End: 2023-02-26

## 2023-02-25 RX ADMIN — FENTANYL CITRATE 25 MCG: 50 INJECTION, SOLUTION INTRAMUSCULAR; INTRAVENOUS at 12:17

## 2023-02-25 RX ADMIN — SODIUM CHLORIDE, PRESERVATIVE FREE 10 ML: 5 INJECTION INTRAVENOUS at 00:07

## 2023-02-25 RX ADMIN — METRONIDAZOLE 500 MG: 500 INJECTION, SOLUTION INTRAVENOUS at 10:47

## 2023-02-25 RX ADMIN — VENLAFAXINE 75 MG: 37.5 TABLET ORAL at 20:52

## 2023-02-25 RX ADMIN — FENTANYL CITRATE 25 MCG: 50 INJECTION, SOLUTION INTRAMUSCULAR; INTRAVENOUS at 07:29

## 2023-02-25 RX ADMIN — SODIUM CHLORIDE, PRESERVATIVE FREE 10 ML: 5 INJECTION INTRAVENOUS at 22:50

## 2023-02-25 RX ADMIN — FENTANYL CITRATE 25 MCG: 50 INJECTION, SOLUTION INTRAMUSCULAR; INTRAVENOUS at 02:07

## 2023-02-25 RX ADMIN — TRAMADOL HYDROCHLORIDE 50 MG: 50 TABLET, COATED ORAL at 13:26

## 2023-02-25 RX ADMIN — METRONIDAZOLE 500 MG: 500 INJECTION, SOLUTION INTRAVENOUS at 23:13

## 2023-02-25 RX ADMIN — SODIUM CHLORIDE 75 ML/HR: 9 INJECTION, SOLUTION INTRAVENOUS at 10:47

## 2023-02-25 RX ADMIN — SODIUM CHLORIDE, PRESERVATIVE FREE 10 ML: 5 INJECTION INTRAVENOUS at 06:33

## 2023-02-25 RX ADMIN — SODIUM CHLORIDE 75 ML/HR: 9 INJECTION, SOLUTION INTRAVENOUS at 23:15

## 2023-02-25 RX ADMIN — ONDANSETRON 4 MG: 2 INJECTION INTRAMUSCULAR; INTRAVENOUS at 14:53

## 2023-02-25 RX ADMIN — ENOXAPARIN SODIUM 30 MG: 100 INJECTION SUBCUTANEOUS at 20:53

## 2023-02-25 RX ADMIN — ONDANSETRON 4 MG: 2 INJECTION INTRAMUSCULAR; INTRAVENOUS at 20:52

## 2023-02-25 RX ADMIN — ONDANSETRON 4 MG: 2 INJECTION INTRAMUSCULAR; INTRAVENOUS at 09:09

## 2023-02-25 RX ADMIN — FENTANYL CITRATE 25 MCG: 50 INJECTION, SOLUTION INTRAMUSCULAR; INTRAVENOUS at 20:52

## 2023-02-25 RX ADMIN — LISINOPRIL 10 MG: 10 TABLET ORAL at 09:07

## 2023-02-25 RX ADMIN — FENTANYL CITRATE 25 MCG: 50 INJECTION, SOLUTION INTRAMUSCULAR; INTRAVENOUS at 17:08

## 2023-02-25 RX ADMIN — ENOXAPARIN SODIUM 30 MG: 100 INJECTION SUBCUTANEOUS at 09:07

## 2023-02-25 RX ADMIN — PANTOPRAZOLE SODIUM 40 MG: 40 TABLET, DELAYED RELEASE ORAL at 09:07

## 2023-02-25 RX ADMIN — VENLAFAXINE 75 MG: 37.5 TABLET ORAL at 09:07

## 2023-02-25 RX ADMIN — ENOXAPARIN SODIUM 30 MG: 100 INJECTION SUBCUTANEOUS at 00:07

## 2023-02-25 NOTE — PROGRESS NOTES
Reason for Admission:  abdominal pain and jaundice                     RUR Score:          9%           Plan for utilizing home health:      none      PCP: First and Last name:  Rexine Goodpasture, MD     Name of Practice:    Are you a current patient: Yes/No:    Approximate date of last visit:    Can you participate in a virtual visit with your PCP:                     Current Advanced Directive/Advance Care Plan: Full Code      Healthcare Decision Maker:   Click here to complete [id number] including selection of the Healthcare Decision Maker Relationship (ie \"Primary\")                             Transition of Care Plan:                      CM reviewed chart. Patient is an inmate at [facility]. He will discharge back there when medically stable. Clinicals faxed to [facility], [facility] liaison at fax number: [phone].

## 2023-02-25 NOTE — PROGRESS NOTES
Bedside shift change report given to [facility] (oncoming nurse) by Nova Henderson (offgoing nurse). Report included the following information SBAR.

## 2023-02-25 NOTE — PROGRESS NOTES
Hospitalist Progress Note         RICHA Wesley, FNP-C    Daily Progress Note: 2/25/2023    Jean Carlos Marinelli is a 32 y.o. male with PMH of asthma, dysphagia, hypertension presented [facility] the ED from [facility] with chief complaint of abdominal pain and jaundice. Patient noticed symptoms about 5 days ago, when his skin became yellow and had right upper abdominal pain, moderate intensity, constant. Associated with abdominal bloating nausea and vomiting and chills. Denies sick contact or have any other people in facility with similar symptoms. No sexual contact, no recent IVDU. Admits to previous IV drug use and history history of hep C, which completed treatment last year. Dysphagia is currently being worked-up by GI as outpatient. Already millie cholecystectomy. In the ED, vital signs stable. Noted abnormal liver enzymes. Acute dehydration, start gentle IV fluids. Subjective:   Subjective     Patient examined alert and oriented with two guards at bedside. Continues to report abdominal discomfort. Jaundice present on examination    Review of Systems:   Review of Systems   Constitutional:  Negative for chills and fever. Respiratory:  Negative for cough. Cardiovascular:  Negative for chest pain. Gastrointestinal:  Positive for abdominal pain and nausea. Genitourinary:  Negative for dysuria. Musculoskeletal:  Negative for myalgias. Objective:   Objective      Vitals:  Patient Vitals for the past 12 hrs:   Temp Pulse Resp BP SpO2   02/25/23 0800 98.1 °F (36.7 °C) 60 18 111/64 99 %   02/25/23 0331 98.1 °F (36.7 °C) 66 17 106/68 98 %        Physical Exam  Vitals and nursing note reviewed. Cardiovascular:      Rate and Rhythm: Normal rate. Pulmonary:      Effort: Pulmonary effort is normal.      Breath sounds: Normal breath sounds. Abdominal:      General: Bowel sounds are normal.      Tenderness: There is abdominal tenderness. Skin:     Coloration: Skin is jaundiced. Neurological:      Mental Status: He is alert and oriented to person, place, and time. Lab Results:  Recent Results (from the past 24 hour(s))   CBC WITH AUTOMATED DIFF    Collection Time: 02/24/23  5:39 PM   Result Value Ref Range    WBC 3.9 (L) 4.1 - 11.1 K/uL    RBC 4.58 4.10 - 5.70 M/uL    HGB 13.1 12.1 - 17.0 g/dL    HCT 37.6 36.6 - 50.3 %    MCV 82.1 80.0 - 99.0 FL    MCH 28.6 26.0 - 34.0 PG    MCHC 34.8 30.0 - 36.5 g/dL    RDW 12.4 11.5 - 14.5 %    PLATELET 850 555 - 647 K/uL    MPV 10.5 8.9 - 12.9 FL    NRBC 0.0 0.0  WBC    ABSOLUTE NRBC 0.00 0.00 - 0.01 K/uL    NEUTROPHILS 51 32 - 75 %    LYMPHOCYTES 43 12 - 49 %    MONOCYTES 6 5 - 13 %    EOSINOPHILS 0 0 - 7 %    BASOPHILS 0 0 - 1 %    IMMATURE GRANULOCYTES 0 %    ABS. NEUTROPHILS 2.0 1.8 - 8.0 K/UL    ABS. LYMPHOCYTES 1.7 0.8 - 3.5 K/UL    ABS. MONOCYTES 0.2 0.0 - 1.0 K/UL    ABS. EOSINOPHILS 0.0 0.0 - 0.4 K/UL    ABS. BASOPHILS 0.0 0.0 - 0.1 K/UL    ABS. IMM. GRANS. 0.0 K/UL    DF Manual      RBC COMMENTS Normocytic, Normochromic     METABOLIC PANEL, BASIC    Collection Time: 02/24/23  5:39 PM   Result Value Ref Range    Sodium 129 (L) 136 - 145 mmol/L    Potassium 4.2 3.5 - 5.1 mmol/L    Chloride 100 97 - 108 mmol/L    CO2 24 21 - 32 mmol/L    Anion gap 5 5 - 15 mmol/L    Glucose 92 65 - 100 mg/dL    BUN 9 6 - 20 mg/dL    Creatinine 1.04 0.70 - 1.30 mg/dL    BUN/Creatinine ratio 9 (L) 12 - 20      eGFR >60 >60 ml/min/1.73m2    Calcium 9.4 8.5 - 10.1 mg/dL   LIPASE    Collection Time: 02/24/23  5:39 PM   Result Value Ref Range    Lipase 75 73 - 393 U/L   HEPATIC FUNCTION PANEL    Collection Time: 02/24/23  5:39 PM   Result Value Ref Range    Protein, total 8.4 (H) 6.4 - 8.2 g/dL    Albumin 3.8 3.5 - 5.0 g/dL    Globulin 4.6 (H) 2.0 - 4.0 g/dL    A-G Ratio 0.8 (L) 1.1 - 2.2      Bilirubin, total 5.7 (H) 0.2 - 1.0 mg/dL    Bilirubin, direct 4.1 (H) 0.0 - 0.2 mg/dL    Alk.  phosphatase 140 (H) 45 - 117 U/L    AST (SGOT) 860 (H) 15 - 37 U/L ALT (SGPT) 836 (H) 12 - 78 U/L   URINALYSIS W/ RFLX MICROSCOPIC    Collection Time: 02/24/23  7:46 PM   Result Value Ref Range    Color [place]      Appearance Clear Clear      Specific gravity 1.010 1.003 - 1.030      pH (UA) 6.0 5.0 - 8.0      Protein Negative Negative mg/dL    Glucose Negative Negative mg/dL    Ketone Negative Negative mg/dL    Bilirubin Small (A) Negative      Blood Negative Negative      Urobilinogen 4.0 (H) 0.1 - 1.0 EU/dL    Nitrites Negative Negative      Leukocyte Esterase Negative Negative      WBC 5-10 0 - 4 /hpf    RBC 0-5 0 - 5 /hpf    Epithelial cells Few Few /lpf    Bacteria Negative Negative /hpf    Bilirubin UA, confirm Positive (A) Negative     PROTHROMBIN TIME + INR    Collection Time: 02/25/23  2:33 AM   Result Value Ref Range    Prothrombin time 14.2 11.9 - 14.6 sec    INR 1.1 0.9 - 1.1     HIV-1,2 P24 AG/AB SCREEN    Collection Time: 02/25/23  2:33 AM   Result Value Ref Range    p24 Antigen Nonreactive Nonreactive      HIV-1,2 Ab Nonreactive Nonreactive     SALICYLATE    Collection Time: 02/25/23  2:33 AM   Result Value Ref Range    Salicylate level <1.9 (L) 2.8 - 20.0 mg/dL   ACETAMINOPHEN    Collection Time: 02/25/23  2:33 AM   Result Value Ref Range    Acetaminophen level <10 (L) 10 - 30 ug/mL    Reported dose date       CBC W/O DIFF    Collection Time: 02/25/23  2:33 AM   Result Value Ref Range    WBC 3.5 (L) 4.1 - 11.1 K/uL    RBC 4.08 (L) 4.10 - 5.70 M/uL    HGB 11.7 (L) 12.1 - 17.0 g/dL    HCT 33.9 (L) 36.6 - 50.3 %    MCV 83.1 80.0 - 99.0 FL    MCH 28.7 26.0 - 34.0 PG    MCHC 34.5 30.0 - 36.5 g/dL    RDW 12.6 11.5 - 14.5 %    PLATELET 965 926 - 425 K/uL    MPV 10.3 8.9 - 12.9 FL    NRBC 0.0 0.0  WBC    ABSOLUTE NRBC 0.00 0.00 - 6.84 K/uL   METABOLIC PANEL, COMPREHENSIVE    Collection Time: 02/25/23  2:33 AM   Result Value Ref Range    Sodium 132 (L) 136 - 145 mmol/L    Potassium 3.5 3.5 - 5.1 mmol/L    Chloride 101 97 - 108 mmol/L    CO2 26 21 - 32 mmol/L    Anion gap 5 5 - 15 mmol/L    Glucose 116 (H) 65 - 100 mg/dL    BUN 8 6 - 20 mg/dL    Creatinine 0.96 0.70 - 1.30 mg/dL    BUN/Creatinine ratio 8 (L) 12 - 20      eGFR >60 >60 ml/min/1.73m2    Calcium 8.9 8.5 - 10.1 mg/dL    Bilirubin, total 4.4 (H) 0.2 - 1.0 mg/dL    AST (SGOT) 683 (H) 15 - 37 U/L    ALT (SGPT) 742 (H) 12 - 78 U/L    Alk. phosphatase 119 (H) 45 - 117 U/L    Protein, total 7.0 6.4 - 8.2 g/dL    Albumin 3.3 (L) 3.5 - 5.0 g/dL    Globulin 3.7 2.0 - 4.0 g/dL    A-G Ratio 0.9 (L) 1.1 - 2.2        Results       ** No results found for the last 336 hours.  **             Diagnostic Images:  CT Results  (Last 48 hours)      None             US LIVER    (Results Pending)             Current Medications:    Current Facility-Administered Medications:     fentaNYL citrate (PF) injection 25 mcg, 25 mcg, IntraVENous, Q4H PRN, Markell Swenson MD, 25 mcg at 02/25/23 2121    metroNIDAZOLE (FLAGYL) IVPB premix 500 mg, 500 mg, IntraVENous, Q12H, Stephanie Garcia NP    0.9% sodium chloride infusion, 75 mL/hr, IntraVENous, CONTINUOUS, Stephanie Garcia NP    sodium chloride (NS) flush 5-40 mL, 5-40 mL, IntraVENous, Q8H, Milton Swenson MD, 10 mL at 02/25/23 9622    sodium chloride (NS) flush 5-40 mL, 5-40 mL, IntraVENous, PRN, Lorri Arciniega MD, 10 mL at 02/25/23 2579    polyethylene glycol (MIRALAX) packet 17 g, 17 g, Oral, DAILY PRN, Markell Swenson MD    ondansetron (ZOFRAN ODT) tablet 4 mg, 4 mg, Oral, Q8H PRN **OR** ondansetron (ZOFRAN) injection 4 mg, 4 mg, IntraVENous, Q6H PRN, Markell Swenson MD, 4 mg at 02/25/23 0909    enoxaparin (LOVENOX) injection 30 mg, 30 mg, SubCUTAneous, Q12H, Markell Swenson MD, 30 mg at 02/25/23 7829    traMADoL (ULTRAM) tablet 50 mg, 50 mg, Oral, Q6H PRN, Lorri Arciniega MD, 50 mg at 02/24/23 2136    lisinopriL (PRINIVIL, ZESTRIL) tablet 10 mg, 10 mg, Oral, DAILY, Markell Swenson MD, 10 mg at 02/25/23 0907    pantoprazole (PROTONIX) tablet 40 mg, 40 mg, Oral, DAILY, Markell Swenson MD, 40 mg at 02/25/23 0907    venlafaxine (EFFEXOR) tablet 75 mg, 75 mg, Oral, BID, Dory Astudillo MD, 75 mg at 02/25/23 0907       ASSESSMENT:    # Elevated LFTs, Jaundice  - Appears to be hepatocellular pattern. Ddx: Viral hepatitis, recurrence of Hep C, autoimmune disease. - Acetaminophen level, salicylate level, acute hepatitis panel, HIV, LILIANA, ferritin and liver profile. - Liver ultrasound pending  - Pain control. Low dose fentanyl PRN. - Consulted GI (returns on Monday)     # History of hepatitis C  - Completed treatment in 2022  - Appreciate GI input regarding possible recurrence. # Essential hypertension   - Continue home medications  - Continue lisinopril     # Acute dehydration  - Continue gentle IV hydration      Full Code  Dvt Prophylaxis lovenox  GI Prophylaxis protonix      Above treatment plan reviewed and discussed with patient in detail at bedside, all questions answered. Care Plan discussed with: Patient/Family    Total time spent with patient: 35 minutes.     Frankey Cart, NP

## 2023-02-25 NOTE — ED PROVIDER NOTES
[facility] EMERGENCY DEPT  EMERGENCY DEPARTMENT HISTORY AND PHYSICAL EXAM      Date: 2/24/2023  Patient Name: Trina Munoz  MRN: [id number]  [place] 1995  Date of evaluation: 2/24/2023  Provider: Annabella Garcia MD   Note Started: 7:54 PM 2/24/23    HISTORY OF PRESENT ILLNESS     Chief Complaint   Patient presents with    Abdominal Pain       History Provided By: Patient    HPI: Trina Munoz, 32 y.o. male with past medical history as reviewed below presents for evaluation of abdominal pain and jaundice. Patient notes that over the last 5 days, his skin has become yellow today his eyes have become yellow. He reports a prior history of hepatitis C, for which he was treated. Symptoms feel similar to before, when he was diagnosed with hepatitis. He endorses epigastric and right upper quadrant abdominal pain that is intermittent and dull. He has felt nauseous, with minimal p.o. intake over the last few days due to nausea. PAST MEDICAL HISTORY   Past Medical History:  Past Medical History:   Diagnosis Date    Asthma     GERD (gastroesophageal reflux disease)     Hypertension        Past Surgical History:  Past Surgical History:   Procedure Laterality Date    HX TONSILLECTOMY         Family History:  Family History   Problem Relation Age of Onset    Heart Disease Father        Social History:  Social History     Tobacco Use    Smoking status: Former     Packs/day: 1.00     Years: 12.00     Pack years: 12.00     Types: Cigarettes   Vaping Use    Vaping Use: Never used   Substance Use Topics    Alcohol use: Not Currently    Drug use: Not Currently     Types: Heroin       Allergies: Allergies   Allergen Reactions    Aspirin Itching    Ibuprofen Unknown (comments)    Rocephin [Ceftriaxone] Unknown (comments)       PCP: Randi Torre MD    Current Meds:   Previous Medications    ALBUTEROL (PROVENTIL HFA, VENTOLIN HFA, PROAIR HFA) 90 MCG/ACTUATION INHALER    Take  by inhalation.     GLECAPREVIR-PIBRENTASVIR (MAVYRET) 100-40 MG TAB    Take 3 Tablets by mouth nightly. LISINOPRIL (PRINIVIL, ZESTRIL) 10 MG TABLET    Take 10 mg by mouth in the morning. PANTOPRAZOLE (PROTONIX) 40 MG TABLET    Take 40 mg by mouth in the morning. VENLAFAXINE (EFFEXOR) 75 MG TABLET    Take 75 mg by mouth two (2) times a day. REVIEW OF SYSTEMS   Review of Systems   Constitutional:  Negative for fever. HENT:  Negative for congestion. Respiratory:  Negative for cough and shortness of breath. Cardiovascular:  Negative for chest pain. Gastrointestinal:  Positive for abdominal pain. Negative for constipation, nausea and vomiting. Genitourinary:  Negative for dysuria. Musculoskeletal:  Negative for arthralgias and myalgias. Skin:  Positive for color change. Negative for rash. Allergic/Immunologic: Negative for immunocompromised state. Neurological:  Negative for syncope. Psychiatric/Behavioral:  Negative for confusion. Positives and Pertinent negatives as per HPI. PHYSICAL EXAM     ED Triage Vitals   ED Encounter Vitals Group      BP 02/24/23 1703 114/74      Pulse (Heart Rate) 02/24/23 1703 70      Resp Rate 02/24/23 1703 19      Temp 02/24/23 1703 98.6 °F (37 °C)      Temp src --       O2 Sat (%) 02/24/23 1703 97 %      Weight 02/24/23 1659 255 lb      Height 02/24/23 1659 5' 11\"     Physical Exam  Vitals reviewed. Constitutional:       General: He is not in acute distress. Appearance: He is not toxic-appearing. HENT:      Head: Normocephalic and atraumatic. Eyes:      General: Scleral icterus present. Extraocular Movements: Extraocular movements intact. Pupils: Pupils are equal, round, and reactive to light. Cardiovascular:      Rate and Rhythm: Normal rate and regular rhythm. Heart sounds: Normal heart sounds. Pulmonary:      Effort: Pulmonary effort is normal.      Breath sounds: Normal breath sounds. Abdominal:      Palpations: Abdomen is soft. Tenderness:  There is no abdominal tenderness. Musculoskeletal:      Right lower leg: No edema. Left lower leg: No edema. Skin:     General: Skin is warm and dry. Coloration: Skin is jaundiced. Neurological:      General: No focal deficit present. Mental Status: He is alert. Psychiatric:         Mood and Affect: Mood normal.         Behavior: Behavior normal.         SCREENINGS               No data recorded        LAB, EKG AND DIAGNOSTIC RESULTS   Labs:  Recent Results (from the past 12 hour(s))   CBC WITH AUTOMATED DIFF    Collection Time: 02/24/23  5:39 PM   Result Value Ref Range    WBC 3.9 (L) 4.1 - 11.1 K/uL    RBC 4.58 4.10 - 5.70 M/uL    HGB 13.1 12.1 - 17.0 g/dL    HCT 37.6 36.6 - 50.3 %    MCV 82.1 80.0 - 99.0 FL    MCH 28.6 26.0 - 34.0 PG    MCHC 34.8 30.0 - 36.5 g/dL    RDW 12.4 11.5 - 14.5 %    PLATELET 103 007 - 252 K/uL    MPV 10.5 8.9 - 12.9 FL    NRBC 0.0 0.0  WBC    ABSOLUTE NRBC 0.00 0.00 - 0.01 K/uL    NEUTROPHILS 51 32 - 75 %    LYMPHOCYTES 43 12 - 49 %    MONOCYTES 6 5 - 13 %    EOSINOPHILS 0 0 - 7 %    BASOPHILS 0 0 - 1 %    IMMATURE GRANULOCYTES 0 %    ABS. NEUTROPHILS 2.0 1.8 - 8.0 K/UL    ABS. LYMPHOCYTES 1.7 0.8 - 3.5 K/UL    ABS. MONOCYTES 0.2 0.0 - 1.0 K/UL    ABS. EOSINOPHILS 0.0 0.0 - 0.4 K/UL    ABS. BASOPHILS 0.0 0.0 - 0.1 K/UL    ABS. IMM.  GRANS. 0.0 K/UL    DF Manual      RBC COMMENTS Normocytic, Normochromic     METABOLIC PANEL, BASIC    Collection Time: 02/24/23  5:39 PM   Result Value Ref Range    Sodium 129 (L) 136 - 145 mmol/L    Potassium 4.2 3.5 - 5.1 mmol/L    Chloride 100 97 - 108 mmol/L    CO2 24 21 - 32 mmol/L    Anion gap 5 5 - 15 mmol/L    Glucose 92 65 - 100 mg/dL    BUN 9 6 - 20 mg/dL    Creatinine 1.04 0.70 - 1.30 mg/dL    BUN/Creatinine ratio 9 (L) 12 - 20      eGFR >60 >60 ml/min/1.73m2    Calcium 9.4 8.5 - 10.1 mg/dL   LIPASE    Collection Time: 02/24/23  5:39 PM   Result Value Ref Range    Lipase 75 73 - 393 U/L   HEPATIC FUNCTION PANEL    Collection Time: 02/24/23  5:39 PM   Result Value Ref Range    Protein, total 8.4 (H) 6.4 - 8.2 g/dL    Albumin 3.8 3.5 - 5.0 g/dL    Globulin 4.6 (H) 2.0 - 4.0 g/dL    A-G Ratio 0.8 (L) 1.1 - 2.2      Bilirubin, total 5.7 (H) 0.2 - 1.0 mg/dL    Bilirubin, direct 4.1 (H) 0.0 - 0.2 mg/dL    Alk. phosphatase 140 (H) 45 - 117 U/L    AST (SGOT) 860 (H) 15 - 37 U/L    ALT (SGPT) 836 (H) 12 - 78 U/L       Radiologic Studies:  Interpretation per the Radiologist below, if available at the time of this note:  No results found. PROCEDURES   Unless otherwise noted below, none  Performed by: Malik Tello MD   Procedures        EMERGENCY DEPARTMENT COURSE and DIFFERENTIAL DIAGNOSIS/MDM   Vitals:    Vitals:    02/24/23 1659 02/24/23 1703   BP:  114/74   Pulse:  70   Resp:  19   Temp:  98.6 °F (37 °C)   SpO2:  97%   Weight: 115.7 kg (255 lb)    Height: 5' 11\" (1.803 m)         Patient was given the following medications:  Medications   sodium chloride 0.9 % bolus infusion 1,000 mL (1,000 mL IntraVENous New Bag 2/24/23 1900)   ondansetron (ZOFRAN) injection 4 mg (4 mg IntraVENous Given 2/24/23 1901)       CC/HPI Summary, DDx, ED Course, and Reassessment:   22-year-old male presents for evaluation of jaundice, abdominal pain and nausea. Suspect hepatic etiology. Per chart review, prior cholecystectomy. Will evaluate with CBC, CMP, UA while treating initially with IV fluids. Abdomen is soft on exam so do not suspect acute intra-abdominal surgical emergency. ED FINAL IMPRESSION     1. Hepatitis          DISPOSITION/PLAN       Admit Note: Pt is being admitted by Emy Ghotra. The results of their tests and reason(s) for their admission have been discussed with pt and/or available family. They convey agreement and understanding for the need to be admitted and for the admission diagnosis. I am the Primary Clinician of Record.  Malik Tello MD (electronically signed)    (Please note that parts of this dictation were completed with voice recognition software. Quite often unanticipated grammatical, syntax, homophones, and other interpretive errors are inadvertently transcribed by the computer software. Please disregards these errors.  Please excuse any errors that have escaped final proofreading.)

## 2023-02-25 NOTE — H&P
History and Physical    Patient: Torey Burns MRN: [id number]  SSN: [id number]    YOB: 1995  Age: 32 y.o. Sex: male      Subjective:      Torey Burns is a 32 y.o. male with PMH of asthma, dysphagia, hypertension presented to the ED from [facility]al facility with chief complaint of abdominal pain and jaundice. Patient noticed symptoms about 5 days ago, when his skin became yellow and had right upper abdominal pain, moderate intensity, constant. Associated with abdominal bloating nausea and vomiting and chills. Denies sick contact or have any other people in facility with similar symptoms. No sexual contact, no recent IVDU. Admits to previous IV drug use and history history of hep C, which completed treatment last year. Dysphagia is currently being worked-up by GI as outpatient. Already millie cholecystectomy. In the ED, vital signs stable. Noted abnormal liver enzymes. Chart review: none    Past Medical History:   Diagnosis Date    Asthma     GERD (gastroesophageal reflux disease)     Hypertension      Family History   Problem Relation Age of Onset    Heart Disease Father      Social History     Tobacco Use    Smoking status: Former     Packs/day: 1.00     Years: 12.00     Pack years: 12.00     Types: Cigarettes    Smokeless tobacco: Not on file   Substance Use Topics    Alcohol use: Not Currently        Objective:     Physical Exam:   General: alert, cooperative, no distress  Eye: conjunctivae/corneas icterus. PERRL, EOM's intact. Throat and Neck: normal and no erythema or exudates noted. No mass   Lung: clear to auscultation bilaterally  Heart: regular rate and rhythm,   Abdomen: soft, RUQ-tender. Bowel sounds normal. No masses,  Extremities: No LE edema. able to move all extremities normal, atraumatic  Skin: jaundice  Neurologic: AOx3. Motor function and sensation grossly intact. Psychiatric: non focal    Most recent lab work and imaging results reviewed in EMR.      Assessment and plan:   # Jaundice  - Appears to be hepatocellular pattern. Ddx: Viral hepatitis, recurrence of Hep C, autoimmune disease. - Acetaminophen level, salicylate level, acute hepatitis panel, HIV, LILIANA, ferritin and liver profile. - Liver ultrasound  - Pain control. Low dose fentanyl PRN. - Consult GI    # History of hepatitis C  - Completed treatment in 2022  - Appreciate GI input regarding possible recurrence. # Essential hypertension   - Continue home medications. PO lisinopril     # Social Determents of health: None    # Full code by default, need further clarification    # Medication reconciliation: Medication list reviewed on Epic and with patient/family.      Signed By: Desi Morin MD     February 24, 2023

## 2023-02-26 LAB
ALBUMIN SERPL-MCNC: 3.1 G/DL (ref 3.5–5)
ALBUMIN/GLOB SERPL: 0.8 (ref 1.1–2.2)
ALP SERPL-CCNC: 121 U/L (ref 45–117)
ALT SERPL-CCNC: 577 U/L (ref 12–78)
ANION GAP SERPL CALC-SCNC: 4 MMOL/L (ref 5–15)
AST SERPL W P-5'-P-CCNC: 250 U/L (ref 15–37)
BILIRUB DIRECT SERPL-MCNC: 2.8 MG/DL (ref 0–0.2)
BILIRUB SERPL-MCNC: 3.3 MG/DL (ref 0.2–1)
BUN SERPL-MCNC: 6 MG/DL (ref 6–20)
BUN/CREAT SERPL: 7 (ref 12–20)
CA-I BLD-MCNC: 8.8 MG/DL (ref 8.5–10.1)
CHLORIDE SERPL-SCNC: 107 MMOL/L (ref 97–108)
CO2 SERPL-SCNC: 25 MMOL/L (ref 21–32)
CREAT SERPL-MCNC: 0.84 MG/DL (ref 0.7–1.3)
GLOBULIN SER CALC-MCNC: 4.1 G/DL (ref 2–4)
GLUCOSE SERPL-MCNC: 114 MG/DL (ref 65–100)
POTASSIUM SERPL-SCNC: 4 MMOL/L (ref 3.5–5.1)
PROT SERPL-MCNC: 7.2 G/DL (ref 6.4–8.2)
SODIUM SERPL-SCNC: 136 MMOL/L (ref 136–145)

## 2023-02-26 PROCEDURE — 80048 BASIC METABOLIC PNL TOTAL CA: CPT

## 2023-02-26 PROCEDURE — 74011250637 HC RX REV CODE- 250/637: Performed by: INTERNAL MEDICINE

## 2023-02-26 PROCEDURE — 74011000250 HC RX REV CODE- 250: Performed by: INTERNAL MEDICINE

## 2023-02-26 PROCEDURE — 65270000029 HC RM PRIVATE

## 2023-02-26 PROCEDURE — 80076 HEPATIC FUNCTION PANEL: CPT

## 2023-02-26 PROCEDURE — 74011250636 HC RX REV CODE- 250/636: Performed by: INTERNAL MEDICINE

## 2023-02-26 PROCEDURE — 74011250637 HC RX REV CODE- 250/637: Performed by: NURSE PRACTITIONER

## 2023-02-26 PROCEDURE — 36415 COLL VENOUS BLD VENIPUNCTURE: CPT

## 2023-02-26 PROCEDURE — 74011250636 HC RX REV CODE- 250/636: Performed by: NURSE PRACTITIONER

## 2023-02-26 RX ORDER — TRAMADOL HYDROCHLORIDE 50 MG/1
50 TABLET ORAL
Status: DISCONTINUED | OUTPATIENT
Start: 2023-02-26 | End: 2023-03-01 | Stop reason: HOSPADM

## 2023-02-26 RX ORDER — MORPHINE SULFATE 2 MG/ML
2 INJECTION, SOLUTION INTRAMUSCULAR; INTRAVENOUS ONCE
Status: COMPLETED | OUTPATIENT
Start: 2023-02-26 | End: 2023-02-26

## 2023-02-26 RX ADMIN — VENLAFAXINE 75 MG: 37.5 TABLET ORAL at 20:02

## 2023-02-26 RX ADMIN — FENTANYL CITRATE 25 MCG: 50 INJECTION, SOLUTION INTRAMUSCULAR; INTRAVENOUS at 03:46

## 2023-02-26 RX ADMIN — SODIUM CHLORIDE, PRESERVATIVE FREE 10 ML: 5 INJECTION INTRAVENOUS at 15:21

## 2023-02-26 RX ADMIN — TRAMADOL HYDROCHLORIDE 50 MG: 50 TABLET, COATED ORAL at 15:19

## 2023-02-26 RX ADMIN — METRONIDAZOLE 500 MG: 500 INJECTION, SOLUTION INTRAVENOUS at 22:35

## 2023-02-26 RX ADMIN — MORPHINE SULFATE 2 MG: 2 INJECTION, SOLUTION INTRAMUSCULAR; INTRAVENOUS at 21:19

## 2023-02-26 RX ADMIN — SODIUM CHLORIDE, PRESERVATIVE FREE 10 ML: 5 INJECTION INTRAVENOUS at 22:35

## 2023-02-26 RX ADMIN — PANTOPRAZOLE SODIUM 40 MG: 40 TABLET, DELAYED RELEASE ORAL at 08:37

## 2023-02-26 RX ADMIN — SODIUM CHLORIDE, PRESERVATIVE FREE 10 ML: 5 INJECTION INTRAVENOUS at 05:53

## 2023-02-26 RX ADMIN — TRAMADOL HYDROCHLORIDE 50 MG: 50 TABLET, COATED ORAL at 20:02

## 2023-02-26 RX ADMIN — TRAMADOL HYDROCHLORIDE 50 MG: 50 TABLET, COATED ORAL at 10:09

## 2023-02-26 RX ADMIN — ENOXAPARIN SODIUM 30 MG: 100 INJECTION SUBCUTANEOUS at 20:02

## 2023-02-26 RX ADMIN — METRONIDAZOLE 500 MG: 500 INJECTION, SOLUTION INTRAVENOUS at 15:19

## 2023-02-26 RX ADMIN — VENLAFAXINE 75 MG: 37.5 TABLET ORAL at 08:37

## 2023-02-26 RX ADMIN — ENOXAPARIN SODIUM 30 MG: 100 INJECTION SUBCUTANEOUS at 08:37

## 2023-02-26 RX ADMIN — FENTANYL CITRATE 25 MCG: 50 INJECTION, SOLUTION INTRAMUSCULAR; INTRAVENOUS at 08:37

## 2023-02-26 NOTE — PROGRESS NOTES
Hospitalist Progress Note         RICHA Evans, FNP-C    Daily Progress Note: 2/26/2023    Chandrika Lipscomb is a 32 y.o. male with PMH of asthma, dysphagia, hypertension presented [facility] the ED from [facility] with chief complaint of abdominal pain and jaundice. Patient noticed symptoms about 5 days ago, when his skin became yellow and had right upper abdominal pain, moderate intensity, constant. Associated with abdominal bloating nausea and vomiting and chills. Denies sick contact or have any other people in facility with similar symptoms. No sexual contact, no recent IVDU. Admits to previous IV drug use and history history of hep C, which completed treatment last year. Dysphagia is currently being worked-up by GI as outpatient. Already millie cholecystectomy. In the ED, vital signs stable. Noted abnormal liver enzymes. Acute dehydration, start gentle IV fluids. Subjective:   Subjective     Patient examined alert and oriented with two guards at bedside. Continues to report abdominal discomfort. Review of Systems:   Review of Systems   Constitutional:  Negative for chills and fever. Respiratory:  Negative for cough. Cardiovascular:  Negative for chest pain. Gastrointestinal:  Positive for abdominal pain. Genitourinary:  Negative for dysuria. Musculoskeletal:  Negative for myalgias. Objective:   Objective      Vitals:  Patient Vitals for the past 12 hrs:   Temp Pulse Resp BP SpO2   02/26/23 0800 97.9 °F (36.6 °C) 63 18 102/67 98 %   02/26/23 0315 98.2 °F (36.8 °C) 60 20 (!) 101/57 97 %          Physical Exam  Vitals and nursing note reviewed. Cardiovascular:      Rate and Rhythm: Normal rate. Pulmonary:      Effort: Pulmonary effort is normal.      Breath sounds: Normal breath sounds. Abdominal:      General: Bowel sounds are normal. There is no distension. Tenderness: There is no abdominal tenderness. Skin:     General: Skin is warm and dry.    Neurological: Mental Status: He is alert and oriented to person, place, and time. Lab Results:  Recent Results (from the past 24 hour(s))   METABOLIC PANEL, BASIC    Collection Time: 02/26/23  9:05 AM   Result Value Ref Range    Sodium 136 136 - 145 mmol/L    Potassium 4.0 3.5 - 5.1 mmol/L    Chloride 107 97 - 108 mmol/L    CO2 25 21 - 32 mmol/L    Anion gap 4 (L) 5 - 15 mmol/L    Glucose 114 (H) 65 - 100 mg/dL    BUN 6 6 - 20 mg/dL    Creatinine 0.84 0.70 - 1.30 mg/dL    BUN/Creatinine ratio 7 (L) 12 - 20      eGFR >60 >60 ml/min/1.73m2    Calcium 8.8 8.5 - 10.1 mg/dL   HEPATIC FUNCTION PANEL    Collection Time: 02/26/23  9:05 AM   Result Value Ref Range    Protein, total 7.2 6.4 - 8.2 g/dL    Albumin 3.1 (L) 3.5 - 5.0 g/dL    Globulin 4.1 (H) 2.0 - 4.0 g/dL    A-G Ratio 0.8 (L) 1.1 - 2.2      Bilirubin, total 3.3 (H) 0.2 - 1.0 mg/dL    Bilirubin, direct 2.8 (H) 0.0 - 0.2 mg/dL    Alk. phosphatase 121 (H) 45 - 117 U/L    AST (SGOT) 250 (H) 15 - 37 U/L    ALT (SGPT) 577 (H) 12 - 78 U/L      Results       ** No results found for the last 336 hours.  **             Diagnostic Images:  CT Results  (Last 48 hours)      None             US LIVER    (Results Pending)             Current Medications:    Current Facility-Administered Medications:     traMADoL (ULTRAM) tablet 50 mg, 50 mg, Oral, Q4H PRN, Loulou Hernandez NP    metroNIDAZOLE (FLAGYL) IVPB premix 500 mg, 500 mg, IntraVENous, Q12H, Stephanie Garcia NP, Last Rate: 100 mL/hr at 02/25/23 2313, 500 mg at 02/25/23 2313    0.9% sodium chloride infusion, 75 mL/hr, IntraVENous, CONTINUOUS, Loulou Hernandez NP, Last Rate: 75 mL/hr at 02/25/23 2315, 75 mL/hr at 02/25/23 2315    sodium chloride (NS) flush 5-40 mL, 5-40 mL, IntraVENous, Q8H, Leela, Milton Gtz MD, 10 mL at 02/26/23 0553    sodium chloride (NS) flush 5-40 mL, 5-40 mL, IntraVENous, PRN, Binh Alva MD, 10 mL at 02/25/23 1014    polyethylene glycol (MIRALAX) packet 17 g, 17 g, Oral, DAILY PRN, Binh Alva MD ondansetron (ZOFRAN ODT) tablet 4 mg, 4 mg, Oral, Q8H PRN **OR** ondansetron (ZOFRAN) injection 4 mg, 4 mg, IntraVENous, Q6H PRN, Ludy Swenson MD, 4 mg at 02/25/23 2052    enoxaparin (LOVENOX) injection 30 mg, 30 mg, SubCUTAneous, Q12H, Ludy Swenson MD, 30 mg at 02/26/23 0837    lisinopriL (PRINIVIL, ZESTRIL) tablet 10 mg, 10 mg, Oral, DAILY, Ludy Swenson MD, 10 mg at 02/25/23 0313    pantoprazole (PROTONIX) tablet 40 mg, 40 mg, Oral, DAILY, Ludy Swenson MD, 40 mg at 02/26/23 0837    venlafaxine (EFFEXOR) tablet 75 mg, 75 mg, Oral, BID, Ludy Swenson MD, 75 mg at 02/26/23 3460       ASSESSMENT:    # Elevated LFTs, Jaundice  - Appears to be hepatocellular pattern. Ddx: Viral hepatitis, recurrence of Hep C, autoimmune disease. - Acetaminophen level, salicylate level, acute hepatitis panel, HIV, LILIANA, ferritin and liver profile. - Liver ultrasound pending  - Pain control. Discontinue fentanyl, continue tramadol  - Consulted GI (returns on Monday)  - --> 742--> 577, --> 683-->250, alk phos 140-->119-->121  - Continue gentle IV hydration     # History of hepatitis C  - Completed treatment in 2022  - Appreciate GI input regarding possible recurrence. - Hepatitis C viral AB reactive     # Essential hypertension   - Continue home medications  - Continue lisinopril     # Acute dehydration  - Continue gentle IV hydration      Full Code  Dvt Prophylaxis lovenox  GI Prophylaxis protonix      Above treatment plan reviewed and discussed with patient in detail at bedside, all questions answered. Care Plan discussed with: Patient/Family    Total time spent with patient: 35 minutes.     Prudence Maker, NP

## 2023-02-26 NOTE — PROGRESS NOTES
Problem: Pain  Goal: *Control of Pain  Outcome: Not Progressing Towards Goal  Goal: *PALLIATIVE CARE:  Alleviation of Pain  Outcome: Not Progressing Towards Goal

## 2023-02-26 NOTE — PROGRESS NOTES
Would like to have his pain medications changed to either higher dosage or more frequent times he can have it.

## 2023-02-27 ENCOUNTER — APPOINTMENT (OUTPATIENT)
Dept: ULTRASOUND IMAGING | Age: 28
End: 2023-02-27
Attending: INTERNAL MEDICINE
Payer: MEDICAID

## 2023-02-27 LAB
ALBUMIN SERPL-MCNC: 3.1 G/DL (ref 3.5–5)
ALBUMIN/GLOB SERPL: 0.8 (ref 1.1–2.2)
ALP SERPL-CCNC: 114 U/L (ref 45–117)
ALT SERPL-CCNC: 387 U/L (ref 12–78)
ANION GAP SERPL CALC-SCNC: 7 MMOL/L (ref 5–15)
AST SERPL W P-5'-P-CCNC: 93 U/L (ref 15–37)
BILIRUB DIRECT SERPL-MCNC: 1.5 MG/DL (ref 0–0.2)
BILIRUB SERPL-MCNC: 1.9 MG/DL (ref 0.2–1)
BUN SERPL-MCNC: 6 MG/DL (ref 6–20)
BUN/CREAT SERPL: 6 (ref 12–20)
CA-I BLD-MCNC: 9.1 MG/DL (ref 8.5–10.1)
CHLORIDE SERPL-SCNC: 106 MMOL/L (ref 97–108)
CO2 SERPL-SCNC: 25 MMOL/L (ref 21–32)
CREAT SERPL-MCNC: 0.97 MG/DL (ref 0.7–1.3)
GLOBULIN SER CALC-MCNC: 3.9 G/DL (ref 2–4)
GLUCOSE SERPL-MCNC: 128 MG/DL (ref 65–100)
POTASSIUM SERPL-SCNC: 3.8 MMOL/L (ref 3.5–5.1)
PROT SERPL-MCNC: 7 G/DL (ref 6.4–8.2)
SODIUM SERPL-SCNC: 138 MMOL/L (ref 136–145)

## 2023-02-27 PROCEDURE — 76705 ECHO EXAM OF ABDOMEN: CPT

## 2023-02-27 PROCEDURE — 36415 COLL VENOUS BLD VENIPUNCTURE: CPT

## 2023-02-27 PROCEDURE — 74011250636 HC RX REV CODE- 250/636: Performed by: LICENSED PRACTICAL NURSE

## 2023-02-27 PROCEDURE — 74011250636 HC RX REV CODE- 250/636: Performed by: INTERNAL MEDICINE

## 2023-02-27 PROCEDURE — 80076 HEPATIC FUNCTION PANEL: CPT

## 2023-02-27 PROCEDURE — 80048 BASIC METABOLIC PNL TOTAL CA: CPT

## 2023-02-27 PROCEDURE — 74011000250 HC RX REV CODE- 250: Performed by: INTERNAL MEDICINE

## 2023-02-27 PROCEDURE — 74011250637 HC RX REV CODE- 250/637: Performed by: INTERNAL MEDICINE

## 2023-02-27 PROCEDURE — 74011250636 HC RX REV CODE- 250/636: Performed by: NURSE PRACTITIONER

## 2023-02-27 PROCEDURE — 74011250637 HC RX REV CODE- 250/637: Performed by: NURSE PRACTITIONER

## 2023-02-27 PROCEDURE — 65270000029 HC RM PRIVATE

## 2023-02-27 RX ORDER — MORPHINE SULFATE 2 MG/ML
2 INJECTION, SOLUTION INTRAMUSCULAR; INTRAVENOUS
Status: DISCONTINUED | OUTPATIENT
Start: 2023-02-27 | End: 2023-02-28

## 2023-02-27 RX ADMIN — MORPHINE SULFATE 2 MG: 2 INJECTION, SOLUTION INTRAMUSCULAR; INTRAVENOUS at 14:29

## 2023-02-27 RX ADMIN — PANTOPRAZOLE SODIUM 40 MG: 40 TABLET, DELAYED RELEASE ORAL at 10:01

## 2023-02-27 RX ADMIN — SODIUM CHLORIDE 75 ML/HR: 9 INJECTION, SOLUTION INTRAVENOUS at 04:17

## 2023-02-27 RX ADMIN — SODIUM CHLORIDE, PRESERVATIVE FREE 10 ML: 5 INJECTION INTRAVENOUS at 21:13

## 2023-02-27 RX ADMIN — TRAMADOL HYDROCHLORIDE 50 MG: 50 TABLET, COATED ORAL at 10:00

## 2023-02-27 RX ADMIN — TRAMADOL HYDROCHLORIDE 50 MG: 50 TABLET, COATED ORAL at 04:16

## 2023-02-27 RX ADMIN — ENOXAPARIN SODIUM 30 MG: 100 INJECTION SUBCUTANEOUS at 10:01

## 2023-02-27 RX ADMIN — SODIUM CHLORIDE 75 ML/HR: 9 INJECTION, SOLUTION INTRAVENOUS at 18:59

## 2023-02-27 RX ADMIN — TRAMADOL HYDROCHLORIDE 50 MG: 50 TABLET, COATED ORAL at 00:07

## 2023-02-27 RX ADMIN — MORPHINE SULFATE 2 MG: 2 INJECTION, SOLUTION INTRAMUSCULAR; INTRAVENOUS at 20:01

## 2023-02-27 RX ADMIN — VENLAFAXINE 75 MG: 37.5 TABLET ORAL at 10:00

## 2023-02-27 RX ADMIN — SODIUM CHLORIDE, PRESERVATIVE FREE 10 ML: 5 INJECTION INTRAVENOUS at 05:06

## 2023-02-27 RX ADMIN — ENOXAPARIN SODIUM 30 MG: 100 INJECTION SUBCUTANEOUS at 20:01

## 2023-02-27 RX ADMIN — SODIUM CHLORIDE 75 ML/HR: 9 INJECTION, SOLUTION INTRAVENOUS at 14:32

## 2023-02-27 RX ADMIN — VENLAFAXINE 75 MG: 37.5 TABLET ORAL at 20:01

## 2023-02-27 NOTE — PROGRESS NOTES
Hospitalist Progress Note            Daily Progress Note: 2/27/2023 7:34 AM  Hospital course:   Eden Ricci is a 32 y.o. male with PMH of asthma, dysphagia, hypertension, and cholecystectomy that presented to the ED from correctional facility with chief complaint of abdominal pain and jaundice. Patient noticed symptoms about 5 days ago, when his skin became yellow and had right upper abdominal pain, moderate intensity, constant. Associated with abdominal bloating nausea and vomiting and chills. Denies sick contact or have any other people in facility with similar symptoms. No sexual contact, no recent IVDU. Admits to previous IV drug use and history history of hep C, which completed treatment last year. In the ED, labs were remarkable for abnormal liver enzymes. Gentle fluid hydration was started. GI consulted. Subjective:     Seen and examined at bedside    Patient reports being 8 out of 10 abdominal pain; however abdomen is soft and nontender on examination. Patient barely winces when pushing on his abdomen. He has significant stress about pain medication which I have adjusted for him. Assessment/Plan:   Principal Problem:    Hepatitis (2/24/2023)    Active Problems:    Jaundice (2/25/2023)      # Elevated LFTs, Transaminitis, Jaundice  - Appears to be hepatocellular pattern. Ddx: Viral hepatitis, recurrence of Hep C, autoimmune disease. - Acetaminophen level, salicylate level, hep A IgM, hep B surface antigen, hep B core antigen are within normal limits  --HIV is nonreactive  --Hepatitis C virus antibody is reactive  --LILIANA pending  --Ferritin is elevated, iron saturation is low  - Liver ultrasound reveals no acute process with scant perihepatic ascites. - Pain control. Discontinue fentanyl, continue tramadol.  Added Morphine for breakthrough pain  - Consulted GI  - -->387, -->93 , alk phos 140-->114  - Continue gentle IV hydration     # History of hepatitis C  - Completed treatment in 2022  - Appreciate GI input regarding possible recurrence. - Hepatitis C viral AB reactive     # Essential hypertension   - Continue home medications  - Continue lisinopril      # Acute dehydration  - Continue gentle IV hydration      DVT Prophylaxis: Lovenox  Code Status: Full Code  POA/NOK:    This care involved high complexity medical decision making. I personally:  Reviewed the flow sheet and previous days notes  Reviewed and summarized records/history from previous days note or discussions with staff and family  Reviewed High Risk Drug therapy requiring intensive monitoring for toxicity to include but is not limited to steroids, pressors and antibiotics  Reviewed and/or clinical laboratory tests  Reviewed images and/or ordered radiology tests  Reviewed the patient's EKG/telemetry  Discussed my assessment/management with: Nursing, Patient, Family, and Hospitalist colleagues for coordination of care.      Disposition and discharge barriers:   GI consult/clearance; resolution of symptoms, discharge in 24-48 hrs  Care Plan discussed with: Patient, nursing, case management    Current Facility-Administered Medications   Medication Dose Route Frequency    traMADoL (ULTRAM) tablet 50 mg  50 mg Oral Q4H PRN    0.9% sodium chloride infusion  75 mL/hr IntraVENous CONTINUOUS    sodium chloride (NS) flush 5-40 mL  5-40 mL IntraVENous Q8H    sodium chloride (NS) flush 5-40 mL  5-40 mL IntraVENous PRN    polyethylene glycol (MIRALAX) packet 17 g  17 g Oral DAILY PRN    ondansetron (ZOFRAN ODT) tablet 4 mg  4 mg Oral Q8H PRN    Or    ondansetron (ZOFRAN) injection 4 mg  4 mg IntraVENous Q6H PRN    enoxaparin (LOVENOX) injection 30 mg  30 mg SubCUTAneous Q12H    lisinopriL (PRINIVIL, ZESTRIL) tablet 10 mg  10 mg Oral DAILY    pantoprazole (PROTONIX) tablet 40 mg  40 mg Oral DAILY    venlafaxine (EFFEXOR) tablet 75 mg  75 mg Oral BID        REVIEW OF SYSTEMS    Review of Systems   Constitutional:  Positive for malaise/fatigue. Respiratory:  Negative for cough, shortness of breath and wheezing. Cardiovascular:  Negative for chest pain, claudication and leg swelling. Gastrointestinal:  Positive for abdominal pain (improved). Negative for nausea and vomiting. Neurological:  Negative for dizziness and headaches. Objective:     Visit Vitals  /66 (BP 1 Location: Right upper arm, BP Patient Position: Sitting; At rest)   Pulse 62   Temp 98.1 °F (36.7 °C)   Resp 20   Ht 5' 11\" (1.803 m)   Wt 115.7 kg (255 lb)   SpO2 97%   BMI 35.57 kg/m²      O2 Device: None (Room air)    Temp (24hrs), Av °F (36.7 °C), Min:97.9 °F (36.6 °C), Max:98.2 °F (36.8 °C)        PHYSICAL EXAM:    Physical Exam  Vitals and nursing note reviewed. Constitutional:       General: He is not in acute distress. Appearance: He is obese. He is ill-appearing. Cardiovascular:      Rate and Rhythm: Normal rate and regular rhythm. Heart sounds: Normal heart sounds. No murmur heard. Abdominal:      General: There is no distension. Palpations: Abdomen is soft. Tenderness: There is no abdominal tenderness. Musculoskeletal:         General: Normal range of motion. Skin:     Coloration: Skin is jaundiced. Neurological:      Mental Status: He is alert. Mental status is at baseline.         Data Review    Recent Results (from the past 24 hour(s))   METABOLIC PANEL, BASIC    Collection Time: 23  9:05 AM   Result Value Ref Range    Sodium 136 136 - 145 mmol/L    Potassium 4.0 3.5 - 5.1 mmol/L    Chloride 107 97 - 108 mmol/L    CO2 25 21 - 32 mmol/L    Anion gap 4 (L) 5 - 15 mmol/L    Glucose 114 (H) 65 - 100 mg/dL    BUN 6 6 - 20 mg/dL    Creatinine 0.84 0.70 - 1.30 mg/dL    BUN/Creatinine ratio 7 (L) 12 - 20      eGFR >60 >60 ml/min/1.73m2    Calcium 8.8 8.5 - 10.1 mg/dL   HEPATIC FUNCTION PANEL    Collection Time: 23  9:05 AM   Result Value Ref Range    Protein, total 7.2 6.4 - 8.2 g/dL    Albumin 3.1 (L) 3.5 - 5.0 g/dL    Globulin 4.1 (H) 2.0 - 4.0 g/dL    A-G Ratio 0.8 (L) 1.1 - 2.2      Bilirubin, total 3.3 (H) 0.2 - 1.0 mg/dL    Bilirubin, direct 2.8 (H) 0.0 - 0.2 mg/dL    Alk. phosphatase 121 (H) 45 - 117 U/L    AST (SGOT) 250 (H) 15 - 37 U/L    ALT (SGPT) 577 (H) 12 - 78 U/L       US LIVER    (Results Pending)       Intake and Output:  Current Shift: No intake/output data recorded. Last three shifts: No intake/output data recorded. Lab/Data Review:  Recent Labs     02/25/23  0233 02/24/23  1739   WBC 3.5* 3.9*   HGB 11.7* 13.1   HCT 33.9* 37.6    170     Recent Labs     02/26/23  0905 02/25/23  0233 02/24/23  1739    132* 129*   K 4.0 3.5 4.2    101 100   CO2 25 26 24   * 116* 92   BUN 6 8 9   CREA 0.84 0.96 1.04   CA 8.8 8.9 9.4   ALB 3.1* 3.3* 3.8   TBILI 3.3* 4.4* 5.7*   * 742* 836*   INR  --  1.1  --      No results for input(s): PH, PCO2, PO2, HCO3, FIO2 in the last 72 hours. Recent Results (from the past 24 hour(s))   METABOLIC PANEL, BASIC    Collection Time: 02/26/23  9:05 AM   Result Value Ref Range    Sodium 136 136 - 145 mmol/L    Potassium 4.0 3.5 - 5.1 mmol/L    Chloride 107 97 - 108 mmol/L    CO2 25 21 - 32 mmol/L    Anion gap 4 (L) 5 - 15 mmol/L    Glucose 114 (H) 65 - 100 mg/dL    BUN 6 6 - 20 mg/dL    Creatinine 0.84 0.70 - 1.30 mg/dL    BUN/Creatinine ratio 7 (L) 12 - 20      eGFR >60 >60 ml/min/1.73m2    Calcium 8.8 8.5 - 10.1 mg/dL   HEPATIC FUNCTION PANEL    Collection Time: 02/26/23  9:05 AM   Result Value Ref Range    Protein, total 7.2 6.4 - 8.2 g/dL    Albumin 3.1 (L) 3.5 - 5.0 g/dL    Globulin 4.1 (H) 2.0 - 4.0 g/dL    A-G Ratio 0.8 (L) 1.1 - 2.2      Bilirubin, total 3.3 (H) 0.2 - 1.0 mg/dL    Bilirubin, direct 2.8 (H) 0.0 - 0.2 mg/dL    Alk.  phosphatase 121 (H) 45 - 117 U/L    AST (SGOT) 250 (H) 15 - 37 U/L    ALT (SGPT) 577 (H) 12 - 78 U/L           _____________________________________________________________________________  Time spent in direct care including coordination of service, review of data and examination: 45 minutes    ______________________________________________________________________________    REBECCA Hope    This is dictation was done by dragon, computer voice recognition software. Quite often unanticipated grammatical, syntax, homophones and other interpretive errors or inadvertently transcribed by the computer software. Please excuse errors that have escaped final proofreading. Thank you.

## 2023-02-27 NOTE — PROGRESS NOTES
Bedside shift change report given to Ada (oncoming nurse) by Apple Dietz (offgoing nurse). Report included the following information SBAR, Procedure Summary, Intake/Output, MAR, Recent Results, and Quality Measures.

## 2023-02-27 NOTE — PROGRESS NOTES
Comprehensive Nutrition Assessment    Type and Reason for Visit: Initial, Positive nutrition screen (MST 2 - poor po pta)    Nutrition Recommendations/Plan:   Continue w/ regular diet    Continue to monitor, document intakes, wts thanks     Malnutrition Assessment:  Malnutrition Status: Moderate malnutrition (02/27/23 1329)    Context:  Acute illness     Findings of the 6 clinical characteristics of malnutrition:   Energy Intake:  50% or less of est energy requirements for 5 or more days  Weight Loss:  Greater than 5% over 1 month     Body Fat Loss:  Unable to assess,     Muscle Mass Loss:  Unable to assess,    Fluid Accumulation:  Unable to assess,     Strength:  Not performed     Nutrition Assessment:    Admitted w/ abd pain and jaundice. Reports minimal po intake pta d/t nausea. Per emar, po 1-25%, pt endorses poor PO, states hospital food is \"too heavy. \"  Reported 20# wt loss x 1.5 months (18%) d/t loss of appetite, nutr significant. Continue w/current diet, will update preferences for lighter food (chicken soup salad) HYT=900#. Menu provided. Declined ONS. RN endorsed much improved nausea since yesterday, requesting sodas, sweets. RD provided sprite per pt request. Labs: Gluc 114, Alb 3.1, , . Meds: lovenox, lisinopril, pantoprazole, venlafaxine, NS @ 75ml/hr    Nutrition Related Findings:    NFPE deferred d/t pt w/ limited mobility, high BMI likley unclear results. No n/v, c/d, no chew/swallow issues. Last BM 2/24. Edema noted. Wound Type: Surgical incision (incision to abd)    Current Nutrition Intake & Therapies:  Average Meal Intake: 1-25%  Average Supplement Intake: None ordered  ADULT DIET Regular    Anthropometric Measures:  Height: 5' 11\" (180.3 cm)  Ideal Body Weight (IBW): 172 lbs (78 kg)  Current Body Wt:  115.7 kg (255 lb), 148.3 % IBW. Stated (per pt)  Current BMI (kg/m2): 35.6   BMI Category: Obese class 2 (BMI 35.0-39. 9)    Estimated Daily Nutrient Needs:  Energy Requirements Based On: Kcal/kg  Weight Used for Energy Requirements: Current  Energy (kcal/day): 7523-7327 kcal/day (20-25 kcal/kg, obese class 2/hepatits)  Weight Used for Protein Requirements: Current  Protein (g/day): 116-138 g/day (1.0-1.2 g/kg, hepatitis)  Method Used for Fluid Requirements: 1 ml/kcal  Fluid (ml/day): 2911-8915 ml/day    Nutrition Diagnosis:   Inadequate oral intake related to catabolic illness, altered GI function (hepatitis) as evidenced by poor intake prior to admission, intake 0-25%, nausea  Moderate malnutrition, In context of acute illness or injury related to inadequate protein-energy intake as evidenced by Criteria as identified in malnutrition assessment    Nutrition Interventions:   Food and/or Nutrient Delivery: Continue current diet, Start oral nutrition supplement  Nutrition Education/Counseling: No recommendations at this time  Coordination of Nutrition Care: Continue to monitor while inpatient  Plan of Care discussed with: Rn, Pt    Goals:  Goals: Meet at least 75% of estimated needs, by next RD assessment       Nutrition Monitoring and Evaluation:   Behavioral-Environmental Outcomes: None identified  Food/Nutrient Intake Outcomes: Food and nutrient intake, Supplement intake  Physical Signs/Symptoms Outcomes: Biochemical data, Meal time behavior, Weight    Discharge Planning:     Too soon to determine    Joyce Call, RD  Contact: ext [phone] or via PS

## 2023-02-27 NOTE — PROGRESS NOTES
Phoned [facility] facility and was tranfered to the NP with no answer. Left a message I would try again at a later time.

## 2023-02-27 NOTE — PROGRESS NOTES
CM reviewed chart. Patient pending GI consult. CM has sent updates to [facility] with [facility] at [phone]. CM will call Gerry Devine tomorrow once GI has seen patient to give a more in detail update. CM will continue to follow.

## 2023-02-27 NOTE — PROGRESS NOTES
Spiritual Care Assessment/Progress Note  [facility] [place]      NAME: Ranjit Cruz      MRN: [id number]  AGE: 32 y.o. SEX: male  Temple Affiliation: No preference   Language: English     2/27/2023     Total Time (in minutes): 9     Spiritual Assessment begun in [facility] through conversation with:         [x]Patient        [] Family    [] Friend(s)        Reason for Consult: Initial/Spiritual assessment, patient floor     Spiritual beliefs: (Please include comment if needed)     [] Identifies with a deborah tradition:         [] Supported by a deborah community:            [x] Claims no spiritual orientation:           [] Seeking spiritual identity:                [] Adheres to an individual form of spirituality:           [] Not able to assess:                           Identified resources for coping:      [] Prayer                               [] Music                  [] Guided Imagery     [] Family/friends                 [] Pet visits     [] Devotional reading                         [x] Unknown     [] Other:                                               Interventions offered during this visit: (See comments for more details)    Patient Interventions: Initial/Spiritual assessment, patient floor           Plan of Care:     [] Support spiritual and/or cultural needs    [] Support AMD and/or advance care planning process      [] Support grieving process   [] Coordinate Rites and/or Rituals    [] Coordination with community clergy   [] No spiritual needs identified at this time   [] Detailed Plan of Care below (See Comments)  [] Make referral to Music Therapy  [] Make referral to Pet Therapy     [] Make referral to Addiction services  [] Make referral to [facility]  [] Make referral to Spiritual Care Partner  [] No future visits requested        [x] Contact Spiritual Care for further referrals     Comments: I visited Mr. Colon while rounding on [address].  Tammie Rivas shared that he was fine and thanked me for the visit. Spiritual care was declined at this time. Rev.  Kimble Agent, [address]

## 2023-02-27 NOTE — PROGRESS NOTES
Pt states he has been complaining of severe pain all day with no relief from tramadol. This nurse alerted MD Tressa Hope who is on call tonight, he gave a one time order of IV Morphine to treat pain.

## 2023-02-28 ENCOUNTER — APPOINTMENT (OUTPATIENT)
Dept: CT IMAGING | Age: 28
End: 2023-02-28
Attending: LICENSED PRACTICAL NURSE
Payer: MEDICAID

## 2023-02-28 ENCOUNTER — APPOINTMENT (OUTPATIENT)
Dept: MRI IMAGING | Age: 28
End: 2023-02-28
Attending: LICENSED PRACTICAL NURSE
Payer: MEDICAID

## 2023-02-28 LAB
ALBUMIN SERPL-MCNC: 3 G/DL (ref 3.5–5)
ALBUMIN/GLOB SERPL: 0.8 (ref 1.1–2.2)
ALP SERPL-CCNC: 110 U/L (ref 45–117)
ALT SERPL-CCNC: 284 U/L (ref 12–78)
ANION GAP SERPL CALC-SCNC: 4 MMOL/L (ref 5–15)
AST SERPL W P-5'-P-CCNC: 48 U/L (ref 15–37)
BILIRUB DIRECT SERPL-MCNC: 0.9 MG/DL (ref 0–0.2)
BILIRUB SERPL-MCNC: 1.1 MG/DL (ref 0.2–1)
BUN SERPL-MCNC: 6 MG/DL (ref 6–20)
BUN/CREAT SERPL: 7 (ref 12–20)
CA-I BLD-MCNC: 9 MG/DL (ref 8.5–10.1)
CHLORIDE SERPL-SCNC: 107 MMOL/L (ref 97–108)
CO2 SERPL-SCNC: 27 MMOL/L (ref 21–32)
CREAT SERPL-MCNC: 0.82 MG/DL (ref 0.7–1.3)
GLOBULIN SER CALC-MCNC: 3.9 G/DL (ref 2–4)
GLUCOSE SERPL-MCNC: 105 MG/DL (ref 65–100)
POTASSIUM SERPL-SCNC: 3.8 MMOL/L (ref 3.5–5.1)
PROT SERPL-MCNC: 6.9 G/DL (ref 6.4–8.2)
SODIUM SERPL-SCNC: 138 MMOL/L (ref 136–145)

## 2023-02-28 PROCEDURE — 65270000029 HC RM PRIVATE

## 2023-02-28 PROCEDURE — 74011250636 HC RX REV CODE- 250/636: Performed by: INTERNAL MEDICINE

## 2023-02-28 PROCEDURE — 74011250636 HC RX REV CODE- 250/636: Performed by: NURSE PRACTITIONER

## 2023-02-28 PROCEDURE — 74011000250 HC RX REV CODE- 250: Performed by: INTERNAL MEDICINE

## 2023-02-28 PROCEDURE — 36415 COLL VENOUS BLD VENIPUNCTURE: CPT

## 2023-02-28 PROCEDURE — 80048 BASIC METABOLIC PNL TOTAL CA: CPT

## 2023-02-28 PROCEDURE — A9577 INJ MULTIHANCE: HCPCS | Performed by: INTERNAL MEDICINE

## 2023-02-28 PROCEDURE — 74011250636 HC RX REV CODE- 250/636: Performed by: LICENSED PRACTICAL NURSE

## 2023-02-28 PROCEDURE — 74183 MRI ABD W/O CNTR FLWD CNTR: CPT

## 2023-02-28 PROCEDURE — 74011250637 HC RX REV CODE- 250/637: Performed by: NURSE PRACTITIONER

## 2023-02-28 PROCEDURE — 80076 HEPATIC FUNCTION PANEL: CPT

## 2023-02-28 PROCEDURE — 74011250637 HC RX REV CODE- 250/637: Performed by: INTERNAL MEDICINE

## 2023-02-28 RX ORDER — MORPHINE SULFATE 2 MG/ML
2 INJECTION, SOLUTION INTRAMUSCULAR; INTRAVENOUS
Status: DISCONTINUED | OUTPATIENT
Start: 2023-02-28 | End: 2023-03-01 | Stop reason: HOSPADM

## 2023-02-28 RX ADMIN — MORPHINE SULFATE 2 MG: 2 INJECTION, SOLUTION INTRAMUSCULAR; INTRAVENOUS at 04:10

## 2023-02-28 RX ADMIN — SODIUM CHLORIDE, PRESERVATIVE FREE 10 ML: 5 INJECTION INTRAVENOUS at 13:07

## 2023-02-28 RX ADMIN — GADOBENATE DIMEGLUMINE 19 ML: 529 INJECTION, SOLUTION INTRAVENOUS at 15:29

## 2023-02-28 RX ADMIN — MORPHINE SULFATE 2 MG: 2 INJECTION, SOLUTION INTRAMUSCULAR; INTRAVENOUS at 00:16

## 2023-02-28 RX ADMIN — VENLAFAXINE 75 MG: 37.5 TABLET ORAL at 20:20

## 2023-02-28 RX ADMIN — ENOXAPARIN SODIUM 30 MG: 100 INJECTION SUBCUTANEOUS at 08:56

## 2023-02-28 RX ADMIN — SODIUM CHLORIDE 75 ML/HR: 9 INJECTION, SOLUTION INTRAVENOUS at 16:03

## 2023-02-28 RX ADMIN — VENLAFAXINE 75 MG: 37.5 TABLET ORAL at 08:56

## 2023-02-28 RX ADMIN — MORPHINE SULFATE 2 MG: 2 INJECTION, SOLUTION INTRAMUSCULAR; INTRAVENOUS at 15:58

## 2023-02-28 RX ADMIN — SODIUM CHLORIDE, PRESERVATIVE FREE 10 ML: 5 INJECTION INTRAVENOUS at 05:16

## 2023-02-28 RX ADMIN — SODIUM CHLORIDE, PRESERVATIVE FREE 10 ML: 5 INJECTION INTRAVENOUS at 21:37

## 2023-02-28 RX ADMIN — TRAMADOL HYDROCHLORIDE 50 MG: 50 TABLET, COATED ORAL at 20:20

## 2023-02-28 RX ADMIN — SODIUM CHLORIDE 75 ML/HR: 9 INJECTION, SOLUTION INTRAVENOUS at 04:11

## 2023-02-28 RX ADMIN — PANTOPRAZOLE SODIUM 40 MG: 40 TABLET, DELAYED RELEASE ORAL at 08:56

## 2023-02-28 RX ADMIN — TRAMADOL HYDROCHLORIDE 50 MG: 50 TABLET, COATED ORAL at 13:04

## 2023-02-28 RX ADMIN — ENOXAPARIN SODIUM 30 MG: 100 INJECTION SUBCUTANEOUS at 20:20

## 2023-02-28 RX ADMIN — MORPHINE SULFATE 2 MG: 2 INJECTION, SOLUTION INTRAMUSCULAR; INTRAVENOUS at 08:56

## 2023-02-28 RX ADMIN — MORPHINE SULFATE 2 MG: 2 INJECTION, SOLUTION INTRAMUSCULAR; INTRAVENOUS at 22:00

## 2023-02-28 NOTE — PROGRESS NOTES
Hospitalist Progress Note            Daily Progress Note: 2/28/2023 7:34 AM  Hospital course:   Isis Hinojosa is a 32 y.o. male with PMH of asthma, dysphagia, hypertension, and cholecystectomy that [facility] the ED from [facility] with chief complaint of abdominal pain and jaundice. Patient noticed symptoms about 5 days ago, when his skin became yellow and had right upper abdominal pain, moderate intensity, constant. Associated with abdominal bloating nausea and vomiting and chills. Denies sick contact or have any other people in facility with similar symptoms. No sexual contact, no recent IVDU. Admits to previous IV drug use and history history of hep C, which completed treatment last year. In the ED, labs were remarkable for abnormal liver enzymes. Gentle fluid hydration was started. GI consulted. Subjective:     Seen and examined at bedside. Reports improvement in symptoms. States that the morphine has helped his pain. Assessment/Plan:   Principal Problem:    Hepatitis (2/24/2023)    Active Problems:    Jaundice (2/25/2023)    # Elevated LFTs, Transaminitis, Jaundice  - Appears to be hepatocellular pattern. Ddx: Viral hepatitis, recurrence of Hep C, autoimmune disease. - Acetaminophen level, salicylate level, hep A IgM, hep B surface antigen, hep B core antigen are within normal limits  --HIV is nonreactive  --Hepatitis C virus antibody is reactive  --LILIANA pending  --Ferritin is elevated, iron saturation is low  - Liver ultrasound reveals no acute process with scant perihepatic ascites. -- CT abdomen pelvis with contrast pending  --MRCP ordered. With GI planning for ERCP if necessary tomorrow  - Pain control. Discontinue fentanyl, continue tramadol.  Added Morphine for breakthrough pain  -GI Following  - -->284, -->48 , alk phos 140-->110  - Continue gentle IV hydration     # History of hepatitis C  - Completed treatment in 2022  - Appreciate GI input regarding possible recurrence. - Hepatitis C viral AB reactive     # Essential hypertension   - Continue home medications  - Continue lisinopril      # Acute dehydration  - Continue gentle IV hydration      DVT Prophylaxis: Lovenox  Code Status: Full Code  POA/NOK:    This care involved high complexity medical decision making. I personally:  Reviewed the flow sheet and previous days notes  Reviewed and summarized records/history from previous days note or discussions with staff and family  Reviewed High Risk Drug therapy requiring intensive monitoring for toxicity to include but is not limited to steroids, pressors and antibiotics  Reviewed and/or clinical laboratory tests  Reviewed images and/or ordered radiology tests  Reviewed the patient's EKG/telemetry  Discussed my assessment/management with: Nursing, Patient, Family, and Hospitalist colleagues for coordination of care.      Disposition and discharge barriers:   GI consult/clearance; resolution of symptoms, discharge in 24-48 hrs  Care Plan discussed with: Patient, nursing, case management    Current Facility-Administered Medications   Medication Dose Route Frequency    morphine injection 2 mg  2 mg IntraVENous Q4H PRN    traMADoL (ULTRAM) tablet 50 mg  50 mg Oral Q4H PRN    0.9% sodium chloride infusion  75 mL/hr IntraVENous CONTINUOUS    sodium chloride (NS) flush 5-40 mL  5-40 mL IntraVENous Q8H    sodium chloride (NS) flush 5-40 mL  5-40 mL IntraVENous PRN    polyethylene glycol (MIRALAX) packet 17 g  17 g Oral DAILY PRN    ondansetron (ZOFRAN ODT) tablet 4 mg  4 mg Oral Q8H PRN    Or    ondansetron (ZOFRAN) injection 4 mg  4 mg IntraVENous Q6H PRN    enoxaparin (LOVENOX) injection 30 mg  30 mg SubCUTAneous Q12H    lisinopriL (PRINIVIL, ZESTRIL) tablet 10 mg  10 mg Oral DAILY    pantoprazole (PROTONIX) tablet 40 mg  40 mg Oral DAILY    venlafaxine (EFFEXOR) tablet 75 mg  75 mg Oral BID        REVIEW OF SYSTEMS    Review of Systems   Constitutional:  Positive for malaise/fatigue. Respiratory:  Negative for cough, shortness of breath and wheezing. Cardiovascular:  Negative for chest pain, claudication and leg swelling. Gastrointestinal:  Positive for abdominal pain (improved). Negative for nausea and vomiting. Neurological:  Negative for dizziness and headaches. Objective:     Visit Vitals  /75 (BP 1 Location: Right upper arm, BP Patient Position: At rest;Semi fowlers)   Pulse 76   Temp 97.4 °F (36.3 °C)   Resp 18   Ht 5' 11\" (1.803 m)   Wt 115.7 kg (255 lb)   SpO2 97%   BMI 35.57 kg/m²      O2 Device: Nasal cannula    Temp (24hrs), Av.7 °F (36.5 °C), Min:97.4 °F (36.3 °C), Max:98.1 °F (36.7 °C)        PHYSICAL EXAM:    Physical Exam  Vitals and nursing note reviewed. Constitutional:       General: He is not in acute distress. Appearance: He is obese. He is ill-appearing. Cardiovascular:      Rate and Rhythm: Normal rate and regular rhythm. Heart sounds: Normal heart sounds. No murmur heard. Abdominal:      General: There is no distension. Palpations: Abdomen is soft. Tenderness: There is no abdominal tenderness. Musculoskeletal:         General: Normal range of motion. Skin:     Coloration: Skin is jaundiced (improving). Neurological:      Mental Status: He is alert. Mental status is at baseline.         Data Review    Recent Results (from the past 24 hour(s))   METABOLIC PANEL, BASIC    Collection Time: 23 10:57 AM   Result Value Ref Range    Sodium 138 136 - 145 mmol/L    Potassium 3.8 3.5 - 5.1 mmol/L    Chloride 106 97 - 108 mmol/L    CO2 25 21 - 32 mmol/L    Anion gap 7 5 - 15 mmol/L    Glucose 128 (H) 65 - 100 mg/dL    BUN 6 6 - 20 mg/dL    Creatinine 0.97 0.70 - 1.30 mg/dL    BUN/Creatinine ratio 6 (L) 12 - 20      eGFR >60 >60 ml/min/1.73m2    Calcium 9.1 8.5 - 10.1 mg/dL   HEPATIC FUNCTION PANEL    Collection Time: 23 10:57 AM   Result Value Ref Range    Protein, total 7.0 6.4 - 8.2 g/dL    Albumin 3.1 (L) 3.5 - 5.0 g/dL    Globulin 3.9 2.0 - 4.0 g/dL    A-G Ratio 0.8 (L) 1.1 - 2.2      Bilirubin, total 1.9 (H) 0.2 - 1.0 mg/dL    Bilirubin, direct 1.5 (H) 0.0 - 0.2 mg/dL    Alk. phosphatase 114 45 - 117 U/L    AST (SGOT) 93 (H) 15 - 37 U/L    ALT (SGPT) 387 (H) 12 - 78 U/L       US LIVER   Final Result   No acute process shown. Scant perihepatic ascites of unclear etiology. CT   abdomen pelvis with IV contrast should be considered for further evaluation. CT ABD PELV W CONT    (Results Pending)       Intake and Output:  Current Shift: No intake/output data recorded. Last three shifts: 02/26 1901 - 02/28 0700  In: 1225 [P.O.:400; I.V.:825]  Out: -       Lab/Data Review:  No results for input(s): WBC, HGB, HCT, PLT, HGBEXT, HCTEXT, PLTEXT, HGBEXT, HCTEXT, PLTEXT in the last 72 hours. Recent Labs     02/27/23  1057 02/26/23  0905    136   K 3.8 4.0    107   CO2 25 25   * 114*   BUN 6 6   CREA 0.97 0.84   CA 9.1 8.8   ALB 3.1* 3.1*   TBILI 1.9* 3.3*   * 577*       No results for input(s): PH, PCO2, PO2, HCO3, FIO2 in the last 72 hours. Recent Results (from the past 24 hour(s))   METABOLIC PANEL, BASIC    Collection Time: 02/27/23 10:57 AM   Result Value Ref Range    Sodium 138 136 - 145 mmol/L    Potassium 3.8 3.5 - 5.1 mmol/L    Chloride 106 97 - 108 mmol/L    CO2 25 21 - 32 mmol/L    Anion gap 7 5 - 15 mmol/L    Glucose 128 (H) 65 - 100 mg/dL    BUN 6 6 - 20 mg/dL    Creatinine 0.97 0.70 - 1.30 mg/dL    BUN/Creatinine ratio 6 (L) 12 - 20      eGFR >60 >60 ml/min/1.73m2    Calcium 9.1 8.5 - 10.1 mg/dL   HEPATIC FUNCTION PANEL    Collection Time: 02/27/23 10:57 AM   Result Value Ref Range    Protein, total 7.0 6.4 - 8.2 g/dL    Albumin 3.1 (L) 3.5 - 5.0 g/dL    Globulin 3.9 2.0 - 4.0 g/dL    A-G Ratio 0.8 (L) 1.1 - 2.2      Bilirubin, total 1.9 (H) 0.2 - 1.0 mg/dL    Bilirubin, direct 1.5 (H) 0.0 - 0.2 mg/dL    Alk.  phosphatase 114 45 - 117 U/L    AST (SGOT) 93 (H) 15 - 37 U/L    ALT ([facility]) 969 (S) 12 - 78 U/L             _____________________________________________________________________________  Time spent in direct care including coordination of service, review of data and examination: 45 minutes    ______________________________________________________________________________    REBECCA Givens    This is dictation was done by [employer], computer voice recognition software. Quite often unanticipated grammatical, syntax, homophones and other interpretive errors or inadvertently transcribed by the computer software. Please excuse errors that have escaped final proofreading. Thank you.

## 2023-02-28 NOTE — PROGRESS NOTES
CM reviewed chart. Patient planned for MRCP tomorrow per physician's notes. CM has sent updates to [facility] at [phone]. CM also spoke with [facility] at [phone]. CM will continue to follow.

## 2023-02-28 NOTE — PROGRESS NOTES
Physician Progress Note      PATIENT:               Tirso Rivas  CSN #:                  [id number]  :                       1995  ADMIT DATE:       2023 4:55 PM  [address] [place] DATE:  RESPONDING  PROVIDER #:        Debora FERNANDEZ          QUERY TEXT:    Pt admitted with jaundice. Pt noted to have low serum sodium. If possible, please document in the progress notes and discharge summary if you are evaluating and / or treating any of the following: The medical record reflects the following:  Risk Factors: 31 yo male with hepatitis C, HTN, dehydration  Clinical Indicators: Sodium levels:   = 129   = 132  Treatment: IV Normal Saline    Thank you,  Kenyetta Lackey RN, CCDS  Options provided:  -- Hyponatremia  -- Pseudohyponatremia  -- Clinically insignificant low serum sodium  -- Other - I will add my own diagnosis  -- Disagree - Not applicable / Not valid  -- Disagree - Clinically unable to determine / Unknown  -- Refer to Clinical Documentation Reviewer    PROVIDER RESPONSE TEXT:    This patient has low serum sodium which is clinically insignificant.     Query created by: Ranjeet Steen on 2023 10:35 AM      Electronically signed by:  Debora FERNANDEZ 2023 5:08 PM

## 2023-02-28 NOTE — ROUTINE PROCESS
Bedside shift change report given to Ada RN (oncoming nurse) by Ritchie Lyn RN (offgoing nurse). Report included the following information SBAR, MAR, and Quality Measures.

## 2023-02-28 NOTE — PROGRESS NOTES
Problem: Pain  Goal: *Control of Pain  Outcome: Progressing Towards Goal     Problem: Falls - Risk of  Goal: *Absence of Falls  Description: Document Symone Fall Risk and appropriate interventions in the flowsheet.   Outcome: Progressing Towards Goal  Note: Fall Risk Interventions:                                Problem: General Medical Care Plan  Goal: *Vital signs within specified parameters  Outcome: Progressing Towards Goal  Goal: *Labs within defined limits  Outcome: Progressing Towards Goal  Goal: *Absence of infection signs and symptoms  Outcome: Progressing Towards Goal  Goal: *Optimal pain control at patient's stated goal  Outcome: Progressing Towards Goal

## 2023-02-28 NOTE — CONSULTS
Gastroenterology Consult     Referring Physician: Zari Lopez MD     Consult Date: 2/28/2023     Subjective:     Patient was admitted to the hospital with abdominal pain fever and chills for 48 hours. It was associated with dark urine pale stools pain was dull in character mostly located in the right upper quadrant patient has history of gallstones he had a cholecystectomy 8 months ago. At the time of my seeing the patient patient's pain was still there however his fever and chills had improved he is getting antibiotic therapy and ultrasound examination of the abdomen showed some fluid around the liver however did not show any choledocholithiasis. His liver enzymes and jaundice are improving.       Past Medical History:   Diagnosis Date    Asthma     GERD (gastroesophageal reflux disease)     Hypertension      Past Surgical History:   Procedure Laterality Date    HX TONSILLECTOMY        Family History   Problem Relation Age of Onset    Heart Disease Father      Social History     Tobacco Use    Smoking status: Former     Packs/day: 1.00     Years: 12.00     Pack years: 12.00     Types: Cigarettes    Smokeless tobacco: Not on file   Substance Use Topics    Alcohol use: Not Currently      Allergies   Allergen Reactions    Aspirin Itching    Ibuprofen Unknown (comments)    Rocephin [Ceftriaxone] Unknown (comments)     Current Facility-Administered Medications   Medication Dose Route Frequency    morphine injection 2 mg  2 mg IntraVENous Q4H PRN    traMADoL (ULTRAM) tablet 50 mg  50 mg Oral Q4H PRN    0.9% sodium chloride infusion  75 mL/hr IntraVENous CONTINUOUS    sodium chloride (NS) flush 5-40 mL  5-40 mL IntraVENous Q8H    sodium chloride (NS) flush 5-40 mL  5-40 mL IntraVENous PRN    polyethylene glycol (MIRALAX) packet 17 g  17 g Oral DAILY PRN    ondansetron (ZOFRAN ODT) tablet 4 mg  4 mg Oral Q8H PRN    Or    ondansetron (ZOFRAN) injection 4 mg  4 mg IntraVENous Q6H PRN    enoxaparin (LOVENOX) injection 30 mg  30 mg SubCUTAneous Q12H    lisinopriL (PRINIVIL, ZESTRIL) tablet 10 mg  10 mg Oral DAILY    pantoprazole (PROTONIX) tablet 40 mg  40 mg Oral DAILY    venlafaxine (EFFEXOR) tablet 75 mg  75 mg Oral BID        Review of Systems:  A detailed 10 organ review of systems is obtained with pertinent positives as listed in the History of Present Illness and Past Medical History. All others are negative. Objective:     Physical Exam:  Visit Vitals  /75 (BP 1 Location: Right upper arm, BP Patient Position: At rest;Semi fowlers)   Pulse 76   Temp 97.4 °F (36.3 °C)   Resp 18   Ht 5' 11\" (1.803 m)   Wt 115.7 kg (255 lb)   SpO2 97%   BMI 35.57 kg/m²        Skin:  Extremities and face reveal no rashes. No etienne erythema. No telangiectasias on the chest wall. HEENT: Sclerae anicteric. Extra-occular muscles are intact. No oral ulcers. No abnormal pigmentation of the lips. The neck is supple. Cardiovascular: Regular rate and rhythm. No murmurs, gallops, or rubs. PMI nondisplaced. Carotids without bruits. Respiratory:  Comfortable breathing with no accessory muscle use. Clear breath sounds with no wheezes, rales, or rhonchi. GI:  Abdomen nondistended, soft, and nontender. Normal active bowel sounds. No enlargement of the liver or spleen. No masses palpable. Rectal:  Deferred  Musculoskeletal:  No pitting edema of the lower legs. Extremities have good range of motion. No costovertebral tenderness. Neurological:  Gross memory appears intact. Patient is alert and oriented. Psychiatric:  Mood appears appropriate with judgement intact. Lymphatic:  No cervical or supraclavicular adenopathy.     Lab/Data Review:  Recent Results (from the past 24 hour(s))   METABOLIC PANEL, BASIC    Collection Time: 02/28/23  7:27 AM   Result Value Ref Range    Sodium 138 136 - 145 mmol/L    Potassium 3.8 3.5 - 5.1 mmol/L    Chloride 107 97 - 108 mmol/L    CO2 27 21 - 32 mmol/L    Anion gap 4 (L) 5 - 15 mmol/L    Glucose 105 (H) 65 - 100 mg/dL    BUN 6 6 - 20 mg/dL    Creatinine 0.82 0.70 - 1.30 mg/dL    BUN/Creatinine ratio 7 (L) 12 - 20      eGFR >60 >60 ml/min/1.73m2    Calcium 9.0 8.5 - 10.1 mg/dL   HEPATIC FUNCTION PANEL    Collection Time: 02/28/23  7:27 AM   Result Value Ref Range    Protein, total 6.9 6.4 - 8.2 g/dL    Albumin 3.0 (L) 3.5 - 5.0 g/dL    Globulin 3.9 2.0 - 4.0 g/dL    A-G Ratio 0.8 (L) 1.1 - 2.2      Bilirubin, total 1.1 (H) 0.2 - 1.0 mg/dL    Bilirubin, direct 0.9 (H) 0.0 - 0.2 mg/dL    Alk. phosphatase 110 45 - 117 U/L    AST (SGOT) 48 (H) 15 - 37 U/L    ALT (SGPT) 284 (H) 12 - 78 U/L        US LIVER   Final Result   No acute process shown. Scant perihepatic ascites of unclear etiology. CT   abdomen pelvis with IV contrast should be considered for further evaluation. CT ABD PELV W CONT    (Results Pending)   MRI ABD W MRCP W CONT    (Results Pending)          Assessment/Plan:     Principal Problem:    Hepatitis (2/24/2023)    Active Problems:    Jaundice (2/25/2023)    Most likely patient has stone in the common bile duct which may or may not have passed on its own I would recommend an MRCP to see if there is any stone in the bile duct before proceeding with ERCP I would also recommend checking acute hepatitis panel and trying to avoid any hepatotoxic drugs.       IP CONSULT TO GASTROENTEROLOGY    Thank you for allowing me to participate in this patients care  Cc Referring Physician   Vidya Mccartney MD

## 2023-03-01 VITALS
BODY MASS INDEX: 35.7 KG/M2 | OXYGEN SATURATION: 98 % | SYSTOLIC BLOOD PRESSURE: 109 MMHG | HEIGHT: 71 IN | WEIGHT: 255 LBS | DIASTOLIC BLOOD PRESSURE: 65 MMHG | HEART RATE: 76 BPM | TEMPERATURE: 98.2 F | RESPIRATION RATE: 16 BRPM

## 2023-03-01 LAB
ALBUMIN SERPL-MCNC: 3.2 G/DL (ref 3.5–5)
ALBUMIN/GLOB SERPL: 0.8 (ref 1.1–2.2)
ALP SERPL-CCNC: 103 U/L (ref 45–117)
ALT SERPL-CCNC: 214 U/L (ref 12–78)
ANION GAP SERPL CALC-SCNC: 3 MMOL/L (ref 5–15)
AST SERPL W P-5'-P-CCNC: 33 U/L (ref 15–37)
BILIRUB DIRECT SERPL-MCNC: 0.6 MG/DL (ref 0–0.2)
BILIRUB SERPL-MCNC: 0.9 MG/DL (ref 0.2–1)
BUN SERPL-MCNC: 6 MG/DL (ref 6–20)
BUN/CREAT SERPL: 7 (ref 12–20)
CA-I BLD-MCNC: 9.4 MG/DL (ref 8.5–10.1)
CHLORIDE SERPL-SCNC: 108 MMOL/L (ref 97–108)
CO2 SERPL-SCNC: 26 MMOL/L (ref 21–32)
CREAT SERPL-MCNC: 0.84 MG/DL (ref 0.7–1.3)
GLOBULIN SER CALC-MCNC: 4.1 G/DL (ref 2–4)
GLUCOSE SERPL-MCNC: 99 MG/DL (ref 65–100)
POTASSIUM SERPL-SCNC: 4 MMOL/L (ref 3.5–5.1)
PROT SERPL-MCNC: 7.3 G/DL (ref 6.4–8.2)
SODIUM SERPL-SCNC: 137 MMOL/L (ref 136–145)

## 2023-03-01 PROCEDURE — 36415 COLL VENOUS BLD VENIPUNCTURE: CPT

## 2023-03-01 PROCEDURE — 74011250637 HC RX REV CODE- 250/637: Performed by: INTERNAL MEDICINE

## 2023-03-01 PROCEDURE — 74011000250 HC RX REV CODE- 250: Performed by: INTERNAL MEDICINE

## 2023-03-01 PROCEDURE — 74011250636 HC RX REV CODE- 250/636: Performed by: LICENSED PRACTICAL NURSE

## 2023-03-01 PROCEDURE — 74011250636 HC RX REV CODE- 250/636: Performed by: INTERNAL MEDICINE

## 2023-03-01 PROCEDURE — 74011250636 HC RX REV CODE- 250/636: Performed by: NURSE PRACTITIONER

## 2023-03-01 PROCEDURE — 80076 HEPATIC FUNCTION PANEL: CPT

## 2023-03-01 PROCEDURE — 80048 BASIC METABOLIC PNL TOTAL CA: CPT

## 2023-03-01 PROCEDURE — 74011250637 HC RX REV CODE- 250/637: Performed by: NURSE PRACTITIONER

## 2023-03-01 RX ORDER — TRAMADOL HYDROCHLORIDE 50 MG/1
50 TABLET ORAL
Qty: 12 TABLET | Refills: 0 | Status: SHIPPED | OUTPATIENT
Start: 2023-03-01 | End: 2023-03-04

## 2023-03-01 RX ADMIN — PANTOPRAZOLE SODIUM 40 MG: 40 TABLET, DELAYED RELEASE ORAL at 09:00

## 2023-03-01 RX ADMIN — SODIUM CHLORIDE, PRESERVATIVE FREE 10 ML: 5 INJECTION INTRAVENOUS at 05:16

## 2023-03-01 RX ADMIN — TRAMADOL HYDROCHLORIDE 50 MG: 50 TABLET, COATED ORAL at 10:20

## 2023-03-01 RX ADMIN — MORPHINE SULFATE 2 MG: 2 INJECTION, SOLUTION INTRAMUSCULAR; INTRAVENOUS at 13:03

## 2023-03-01 RX ADMIN — ENOXAPARIN SODIUM 30 MG: 100 INJECTION SUBCUTANEOUS at 08:59

## 2023-03-01 RX ADMIN — MORPHINE SULFATE 2 MG: 2 INJECTION, SOLUTION INTRAMUSCULAR; INTRAVENOUS at 03:59

## 2023-03-01 RX ADMIN — VENLAFAXINE 75 MG: 37.5 TABLET ORAL at 08:59

## 2023-03-01 RX ADMIN — LISINOPRIL 10 MG: 10 TABLET ORAL at 09:00

## 2023-03-01 RX ADMIN — SODIUM CHLORIDE 75 ML/HR: 9 INJECTION, SOLUTION INTRAVENOUS at 03:59

## 2023-03-01 NOTE — ROUTINE PROCESS
Bedside shift change report given to [address] [place] [address] Avenue (oncoming nurse) by Mehnaz Mast RN (offgoing nurse). Report included the following information SBAR, MAR, and Quality Measures.

## 2023-03-01 NOTE — PROGRESS NOTES
Problem: Pain  Goal: *Control of Pain  Outcome: Resolved/Met  Goal: *PALLIATIVE CARE:  Alleviation of Pain  Outcome: Resolved/Met     Problem: Patient Education: Go to Patient Education Activity  Goal: Patient/Family Education  Outcome: Resolved/Met     Problem: Falls - Risk of  Goal: *Absence of Falls  Description: Document Symone Fall Risk and appropriate interventions in the flowsheet.   Outcome: Resolved/Met  Note: Fall Risk Interventions:                                Problem: Patient Education: Go to Patient Education Activity  Goal: Patient/Family Education  Outcome: Resolved/Met     Problem: General Medical Care Plan  Goal: *Vital signs within specified parameters  Outcome: Resolved/Met  Goal: *Labs within defined limits  Outcome: Resolved/Met  Goal: *Absence of infection signs and symptoms  Outcome: Resolved/Met  Goal: *Optimal pain control at patient's stated goal  Outcome: Resolved/Met  Goal: *Skin integrity maintained  Outcome: Resolved/Met  Goal: *Fluid volume balance  Outcome: Resolved/Met  Goal: *Optimize nutritional status  Outcome: Resolved/Met  Goal: *Anxiety reduced or absent  Outcome: Resolved/Met  Goal: *Progressive mobility and function (eg: ADL's)  Outcome: Resolved/Met     Problem: Patient Education: Go to Patient Education Activity  Goal: Patient/Family Education  Outcome: Resolved/Met

## 2023-03-01 NOTE — PROGRESS NOTES
Problem: Pain  Goal: *Control of Pain  Outcome: Progressing Towards Goal  Goal: *PALLIATIVE CARE:  Alleviation of Pain  Outcome: Progressing Towards Goal     Problem: Patient Education: Go to Patient Education Activity  Goal: Patient/Family Education  Outcome: Progressing Towards Goal     Problem: Falls - Risk of  Goal: *Absence of Falls  Description: Document [place] Fall Risk and appropriate interventions in the flowsheet.   Outcome: Progressing Towards Goal  Note: Fall Risk Interventions:                                Problem: Patient Education: Go to Patient Education Activity  Goal: Patient/Family Education  Outcome: Progressing Towards Goal     Problem: General Medical Care Plan  Goal: *Vital signs within specified parameters  Outcome: Progressing Towards Goal  Goal: *Labs within defined limits  Outcome: Progressing Towards Goal  Goal: *Absence of infection signs and symptoms  Outcome: Progressing Towards Goal  Goal: *Optimal pain control at patient's stated goal  Outcome: Progressing Towards Goal  Goal: *Skin integrity maintained  Outcome: Progressing Towards Goal  Goal: *Fluid volume balance  Outcome: Progressing Towards Goal  Goal: *Optimize nutritional status  Outcome: Progressing Towards Goal  Goal: *Anxiety reduced or absent  Outcome: Progressing Towards Goal  Goal: *Progressive mobility and function (eg: ADL's)  Outcome: Progressing Towards Goal     Problem: Patient Education: Go to Patient Education Activity  Goal: Patient/Family Education  Outcome: Progressing Towards Goal

## 2023-03-01 NOTE — PROGRESS NOTES
Progress Note    Patient: Erlinda Mays MRN: [id number]  SSN: [id number]    YOB: 1995  Age: 32 y.o. Sex: male      Admit Date: 2/24/2023    LOS: 5 days     Subjective:     Patient was admitted to the hospital with abdominal pain fever and chills for 48 hours. It was associated with dark urine pale stools pain was dull in character mostly located in the right upper quadrant patient has history of gallstones he had a cholecystectomy 8 months ago. 2/28 - At the time of my seeing the patient patient's pain was still there however his fever and chills had improved he is getting antibiotic therapy and ultrasound examination of the abdomen showed some fluid around the liver however did not show any choledocholithiasis. His liver enzymes and jaundice are improving. 3/1 - Patient states his abdominal pain has improved and is not experiencing any fever or chills. No other complaints. ALT has further improved from 284 yesterday to 214 today. Bilirubin has improved from 0.9 yesterday to 0.6 today. Objective:     Vitals:    02/28/23 2307 03/01/23 0205 03/01/23 0759 03/01/23 1451   BP: 111/71 107/68 123/75 109/65   Pulse: 75 61 63 76   Resp: 18 18 18 16   Temp: 98 °F (36.7 °C) 97.6 °F (36.4 °C) 97.8 °F (36.6 °C) 98.2 °F (36.8 °C)   SpO2: 99% 96% 100% 98%   Weight:       Height:            Intake and Output:  Current Shift: No intake/output data recorded. Last three shifts: 02/27 1901 - 03/01 0700  In: 2550 [P.O.:900; I.V.:1650]  Out: -     Physical Exam:   Skin:  Extremities and face reveal no rashes. No etienne erythema. HEENT: Sclerae anicteric. Extra-occular muscles are intact. No abnormal pigmentation of the lips. The neck is supple. Cardiovascular: Regular rate and rhythm. Respiratory:  Comfortable breathing with no accessory muscle use. GI:  Abdomen nondistended, soft, and nontender. No enlargement of the liver or spleen. No masses palpable.   Rectal:  Deferred  Musculoskeletal: Extremities have good range of motion. Neurological:  Gross memory appears intact. Patient is alert and oriented. Psychiatric:  Mood appears appropriate with judgement intact. Lymphatic:  No visible adenopathy      Lab/Data Review:  Recent Results (from the past 24 hour(s))   METABOLIC PANEL, BASIC    Collection Time: 03/01/23  7:53 AM   Result Value Ref Range    Sodium 137 136 - 145 mmol/L    Potassium 4.0 3.5 - 5.1 mmol/L    Chloride 108 97 - 108 mmol/L    CO2 26 21 - 32 mmol/L    Anion gap 3 (L) 5 - 15 mmol/L    Glucose 99 65 - 100 mg/dL    BUN 6 6 - 20 mg/dL    Creatinine 0.84 0.70 - 1.30 mg/dL    BUN/Creatinine ratio 7 (L) 12 - 20      eGFR >60 >60 ml/min/1.73m2    Calcium 9.4 8.5 - 10.1 mg/dL   HEPATIC FUNCTION PANEL    Collection Time: 03/01/23  7:53 AM   Result Value Ref Range    Protein, total 7.3 6.4 - 8.2 g/dL    Albumin 3.2 (L) 3.5 - 5.0 g/dL    Globulin 4.1 (H) 2.0 - 4.0 g/dL    A-G Ratio 0.8 (L) 1.1 - 2.2      Bilirubin, total 0.9 0.2 - 1.0 mg/dL    Bilirubin, direct 0.6 (H) 0.0 - 0.2 mg/dL    Alk. phosphatase 103 45 - 117 U/L    AST (SGOT) 33 15 - 37 U/L    ALT (SGPT) 214 (H) 12 - 78 U/L              MRI ABD W MRCP W WO CONT   Final Result      No acute pathology. Status post cholecystomy. No biliary dilatation. No biliary   filling defects are noted. [email or web address]      -CT-MRI--[email or web address]      US LIVER   Final Result   No acute process shown. Scant perihepatic ascites of unclear etiology. CT   abdomen pelvis with IV contrast should be considered for further evaluation. Assessment:     Principal Problem:    Hepatitis (2/24/2023)    Active Problems:    Jaundice (2/25/2023)    MRCP showed No acute pathology. Status post cholecystomy. No biliary dilatation. No biliary  filling defects are noted. Plan:     Patient can be discharged from GI perspective.   Follow up outpatient in 2 weeks  Thank you for allowing me to participate in this patients care    Signed By: Honorio Ang     March 1, 2023

## 2023-03-01 NOTE — PROGRESS NOTES
Hospitalist Progress Note            Daily Progress Note: 3/1/2023 7:34 AM  Hospital course:   Janki Wilson is a 32 y.o. male with PMH of asthma, dysphagia, hypertension, and cholecystectomy that presented to the ED from [facility] facility with chief complaint of abdominal pain and jaundice. Patient noticed symptoms about 5 days ago, when his skin became yellow and had right upper abdominal pain, moderate intensity, constant. Associated with abdominal bloating nausea and vomiting and chills. Denies sick contact or have any other people in facility with similar symptoms. No sexual contact, no recent IVDU. Admits to previous IV drug use and history history of hep C, which completed treatment last year. In the ED, labs were remarkable for abnormal liver enzymes. Gentle fluid hydration was started. GI consulted. Subjective:     Seen and examined at bedside. Reports improvement in symptoms. Assessment/Plan:   Principal Problem:    Hepatitis (2/24/2023)    Active Problems:    Jaundice (2/25/2023)    # Elevated LFTs, Transaminitis, Jaundice  - Appears to be hepatocellular pattern. Ddx: Viral hepatitis, recurrence of Hep C, autoimmune disease. - Acetaminophen level, salicylate level, hep A IgM, hep B surface antigen, hep B core antigen are within normal limits  --HIV is nonreactive  --Hepatitis C virus antibody is reactive  --LILIANA pending  --Ferritin is elevated, iron saturation is low  - Liver ultrasound reveals no acute process with scant perihepatic ascites. --MRCP reveals no acute pathology, no biliary dilatation, no biliary filling defects.  - Pain control. Discontinue fentanyl, continue tramadol. Added Morphine for breakthrough pain  -GI Following  - -->214, -->33 , alk phos 140-->110  - Continue gentle IV hydration     # History of hepatitis C  - Completed treatment in 2022  - Appreciate GI input regarding possible recurrence.    - Hepatitis C viral AB reactive     # Essential hypertension   - Continue home medications  - Continue lisinopril      # Acute dehydration  - Continue gentle IV hydration      DVT Prophylaxis: Lovenox  Code Status: Full Code  POA/NOK:    This care involved high complexity medical decision making. I personally:  Reviewed the flow sheet and previous days notes  Reviewed and summarized records/history from previous days note or discussions with staff and family  Reviewed High Risk Drug therapy requiring intensive monitoring for toxicity to include but is not limited to steroids, pressors and antibiotics  Reviewed and/or clinical laboratory tests  Reviewed images and/or ordered radiology tests  Reviewed the patient's EKG/telemetry  Discussed my assessment/management with: Nursing, Patient, Family, and Hospitalist colleagues for coordination of care. Disposition and discharge barriers:   GI consult/clearance; resolution of symptoms, discharge in 24-48 hrs  Care Plan discussed with: Patient, nursing, case management    Current Facility-Administered Medications   Medication Dose Route Frequency    morphine injection 2 mg  2 mg IntraVENous Q6H PRN    traMADoL (ULTRAM) tablet 50 mg  50 mg Oral Q4H PRN    0.9% sodium chloride infusion  75 mL/hr IntraVENous CONTINUOUS    sodium chloride (NS) flush 5-40 mL  5-40 mL IntraVENous Q8H    sodium chloride (NS) flush 5-40 mL  5-40 mL IntraVENous PRN    polyethylene glycol (MIRALAX) packet 17 g  17 g Oral DAILY PRN    ondansetron (ZOFRAN ODT) tablet 4 mg  4 mg Oral Q8H PRN    Or    ondansetron (ZOFRAN) injection 4 mg  4 mg IntraVENous Q6H PRN    enoxaparin (LOVENOX) injection 30 mg  30 mg SubCUTAneous Q12H    lisinopriL (PRINIVIL, ZESTRIL) tablet 10 mg  10 mg Oral DAILY    pantoprazole (PROTONIX) tablet 40 mg  40 mg Oral DAILY    venlafaxine (EFFEXOR) tablet 75 mg  75 mg Oral BID        REVIEW OF SYSTEMS    Review of Systems   Constitutional:  Positive for malaise/fatigue.    Respiratory:  Negative for cough, shortness of breath and wheezing. Cardiovascular:  Negative for chest pain, claudication and leg swelling. Gastrointestinal:  Positive for abdominal pain (improved). Negative for nausea and vomiting. Neurological:  Negative for dizziness and headaches. Objective:     Visit Vitals  /75 (BP 1 Location: Right upper arm, BP Patient Position: At rest)   Pulse 63   Temp 97.8 °F (36.6 °C)   Resp 18   Ht 5' 11\" (1.803 m)   Wt 115.7 kg (255 lb)   SpO2 100%   BMI 35.57 kg/m²      O2 Device: None (Room air)    Temp (24hrs), Av.9 °F (36.6 °C), Min:97.6 °F (36.4 °C), Max:98 °F (36.7 °C)        PHYSICAL EXAM:    Physical Exam  Vitals and nursing note reviewed. Constitutional:       General: He is not in acute distress. Appearance: He is obese. He is ill-appearing. Cardiovascular:      Rate and Rhythm: Normal rate and regular rhythm. Heart sounds: Normal heart sounds. No murmur heard. Abdominal:      General: There is no distension. Palpations: Abdomen is soft. Tenderness: There is no abdominal tenderness. Musculoskeletal:         General: Normal range of motion. Skin:     Coloration: Skin is jaundiced (improving). Neurological:      Mental Status: He is alert. Mental status is at baseline.         Data Review    Recent Results (from the past 24 hour(s))   METABOLIC PANEL, BASIC    Collection Time: 23  7:53 AM   Result Value Ref Range    Sodium 137 136 - 145 mmol/L    Potassium 4.0 3.5 - 5.1 mmol/L    Chloride 108 97 - 108 mmol/L    CO2 26 21 - 32 mmol/L    Anion gap 3 (L) 5 - 15 mmol/L    Glucose 99 65 - 100 mg/dL    BUN 6 6 - 20 mg/dL    Creatinine 0.84 0.70 - 1.30 mg/dL    BUN/Creatinine ratio 7 (L) 12 - 20      eGFR >60 >60 ml/min/1.73m2    Calcium 9.4 8.5 - 10.1 mg/dL   HEPATIC FUNCTION PANEL    Collection Time: 23  7:53 AM   Result Value Ref Range    Protein, total 7.3 6.4 - 8.2 g/dL    Albumin 3.2 (L) 3.5 - 5.0 g/dL    Globulin 4.1 (H) 2.0 - 4.0 g/dL    A-G Ratio 0.8 (L) 1.1 - 2.2 Bilirubin, total 0.9 0.2 - 1.0 mg/dL    Bilirubin, direct 0.6 (H) 0.0 - 0.2 mg/dL    Alk. phosphatase 103 45 - 117 U/L    AST (SGOT) 33 15 - 37 U/L    ALT (SGPT) 214 (H) 12 - 78 U/L       MRI ABD W MRCP W WO CONT   Final Result      No acute pathology. Status post cholecystomy. No biliary dilatation. No biliary   filling defects are noted. [email or web address]      -CT-MRI--[email or web address]      US LIVER   Final Result   No acute process shown. Scant perihepatic ascites of unclear etiology. CT   abdomen pelvis with IV contrast should be considered for further evaluation. Intake and Output:  Current Shift: No intake/output data recorded. Last three shifts: 02/27 1901 - 03/01 0700  In: 2550 [P.O.:900; I.V.:1650]  Out: -       Lab/Data Review:  No results for input(s): WBC, HGB, HCT, PLT, HGBEXT, HCTEXT, PLTEXT, HGBEXT, HCTEXT, PLTEXT in the last 72 hours. Recent Labs     03/01/23  0753 02/28/23  0727 02/27/23  1057    138 138   K 4.0 3.8 3.8    107 106   CO2 26 27 25   GLU 99 105* 128*   BUN 6 6 6   CREA 0.84 0.82 0.97   CA 9.4 9.0 9.1   ALB 3.2* 3.0* 3.1*   TBILI 0.9 1.1* 1.9*   * 284* 387*       No results for input(s): PH, PCO2, PO2, HCO3, FIO2 in the last 72 hours.   Recent Results (from the past 24 hour(s))   METABOLIC PANEL, BASIC    Collection Time: 03/01/23  7:53 AM   Result Value Ref Range    Sodium 137 136 - 145 mmol/L    Potassium 4.0 3.5 - 5.1 mmol/L    Chloride 108 97 - 108 mmol/L    CO2 26 21 - 32 mmol/L    Anion gap 3 (L) 5 - 15 mmol/L    Glucose 99 65 - 100 mg/dL    BUN 6 6 - 20 mg/dL    Creatinine 0.84 0.70 - 1.30 mg/dL    BUN/Creatinine ratio 7 (L) 12 - 20      eGFR >60 >60 ml/min/1.73m2    Calcium 9.4 8.5 - 10.1 mg/dL   HEPATIC FUNCTION PANEL    Collection Time: 03/01/23  7:53 AM   Result Value Ref Range    Protein, total 7.3 6.4 - 8.2 g/dL    Albumin 3.2 (L) 3.5 - 5.0 g/dL    Globulin 4.1 (H) 2.0 - 4.0 g/dL A-G Ratio 0.8 (L) 1.1 - 2.2      Bilirubin, total 0.9 0.2 - 1.0 mg/dL    Bilirubin, direct 0.6 (H) 0.0 - 0.2 mg/dL    Alk. phosphatase 103 45 - 117 U/L    AST (SGOT) 33 15 - 37 U/L    ALT (SGPT) 214 (H) 12 - 78 U/L             _____________________________________________________________________________  Time spent in direct care including coordination of service, review of data and examination: 45 minutes    ______________________________________________________________________________    REBECCA Palmer    This is dictation was done by dragon, computer voice recognition software. Quite often unanticipated grammatical, syntax, homophones and other interpretive errors or inadvertently transcribed by the computer software. Please excuse errors that have escaped final proofreading. Thank you.

## 2023-03-01 NOTE — PROGRESS NOTES
Patient has discharge orders to return to [facility] today. DC Summary has been faxed to [place] their liaison and voice message left. Awaiting returned call with confirmation of patient being able to return.

## 2023-03-01 NOTE — DISCHARGE SUMMARY
Hospitalist Discharge Summary     Patient ID:    Erlinda Mays  [id number]  89 y.o.  1995    Admit date: 2/24/2023    Discharge date : 3/1/2023      Final Diagnoses:   Principal Problem:    Hepatitis (2/24/2023)    Active Problems:    Jaundice (2/25/2023)      Reason for Hospitalization/Hospital Course:   Erlinda Mays is a 32 y.o. male with PMH of asthma, dysphagia, hypertension, and cholecystectomy that presented to the ED from correctional facility with chief complaint of abdominal pain and jaundice. Associated with abdominal bloating nausea and vomiting and chills. Denies sick contact or have any other people in facility with similar symptoms. No sexual contact, no recent IVDU. Admits to previous IV drug use and history history of hep C, which completed treatment last year. In the ED, labs were remarkable for abnormal liver enzymes. Gentle fluid hydration was started. GI consulted. MRCP revealed no acute pathology, no biliary dilatation, no biliary filling defects. Patient improved clinically and is stable for discharge back to correctional facility with instructions to maintain good oral hydration and to follow-up with Dr. Pau Ellison in 2 weeks outpatient. Discharge Medications:   Current Discharge Medication List        START taking these medications    Details   traMADoL (ULTRAM) 50 mg tablet Take 1 Tablet by mouth every six (6) hours as needed for Pain for up to 3 days. Max Daily Amount: 200 mg. Qty: 12 Tablet, Refills: 0  Start date: 3/1/2023, End date: 3/4/2023    Associated Diagnoses: Hepatitis           CONTINUE these medications which have NOT CHANGED    Details   glecaprevir-pibrentasvir (Mavyret) 100-40 mg tab Take 3 Tablets by mouth nightly. pantoprazole (PROTONIX) 40 mg tablet Take 40 mg by mouth in the morning. venlafaxine (EFFEXOR) 75 mg tablet Take 75 mg by mouth two (2) times a day.       lisinopriL (PRINIVIL, ZESTRIL) 10 mg tablet Take 10 mg by mouth in the morning. albuterol (PROVENTIL HFA, VENTOLIN HFA, PROAIR HFA) 90 mcg/actuation inhaler Take  by inhalation. Follow up Care:    1. Padmini Lorenzo MD in 1-2 weeks. Follow-up Information       Follow up With Specialties Details Why Yanely Marie MD General Practice  Will see at the facility. [address] [place] [address]  Rosa Stewart [address] Bi Mcgovern MD Gastroenterology Follow up in 2 week(s)  [address] [facility]  [address] [facility]  [phone]                Patient Follow Up Instructions: Activity: Activity as tolerated  Diet:  Regular Diet  Wound Care: None needed     Condition at Discharge:  Stable  __________________________________________________________________    Disposition  Home or Self Care  ____________________________________________________________________    Code Status:  Full Code  ___________________________________________________________________    Discharge Exam:  Patient seen and examined by me on discharge day. Pertinent Findings:  Gen:    Not in distress  Chest: Clear lungs  CVS:   Regular rhythm.   No edema  Abd:  Soft, not distended, not tender  Neuro:  Alert        CONSULTATIONS: GI    Significant Diagnostic Studies:   Recent Results (from the past 24 hour(s))   METABOLIC PANEL, BASIC    Collection Time: 03/01/23  7:53 AM   Result Value Ref Range    Sodium 137 136 - 145 mmol/L    Potassium 4.0 3.5 - 5.1 mmol/L    Chloride 108 97 - 108 mmol/L    CO2 26 21 - 32 mmol/L    Anion gap 3 (L) 5 - 15 mmol/L    Glucose 99 65 - 100 mg/dL    BUN 6 6 - 20 mg/dL    Creatinine 0.84 0.70 - 1.30 mg/dL    BUN/Creatinine ratio 7 (L) 12 - 20      eGFR >60 >60 ml/min/1.73m2    Calcium 9.4 8.5 - 10.1 mg/dL   HEPATIC FUNCTION PANEL    Collection Time: 03/01/23  7:53 AM   Result Value Ref Range    Protein, total 7.3 6.4 - 8.2 g/dL    Albumin 3.2 (L) 3.5 - 5.0 g/dL    Globulin 4.1 (H) 2.0 - 4.0 g/dL    A-G Ratio 0.8 (L) 1.1 - 2.2 Bilirubin, total 0.9 0.2 - 1.0 mg/dL    Bilirubin, direct 0.6 (H) 0.0 - 0.2 mg/dL    Alk. phosphatase 103 45 - 117 U/L    AST (SGOT) 33 15 - 37 U/L    ALT (SGPT) 214 (H) 12 - 78 U/L     MRI ABD W MRCP W WO CONT   Final Result      No acute pathology. Status post cholecystomy. No biliary dilatation. No biliary   filling defects are noted. [email or web address]      -CT-MRI--[email or web address]      US LIVER   Final Result   No acute process shown. Scant perihepatic ascites of unclear etiology. CT   abdomen pelvis with IV contrast should be considered for further evaluation.           Time spent in direct and indirect care including coordination of services: 35 minutes    Signed:  Laurent Hinkle  3/1/2023  1:40 PM

## 2023-03-01 NOTE — PROGRESS NOTES
Discharge plan of care/case management plan validated with provider discharge order. Pt education provided at bedside with verbal understanding stated. Peripheral IV removed. Catheter intact.  Security notified and pt stable at discharge.  ---------  EMRE Smith

## 2023-03-03 LAB — ANA TITR SER IF: NEGATIVE

## (undated) DEVICE — SUTURE SZ 0 27IN 5/8 CIR UR-6  TAPER PT VIOLET ABSRB VICRYL J603H

## (undated) DEVICE — COUNTER NDL 40 COUNT HLD 70 FOAM BLK ADH W/ MAG

## (undated) DEVICE — SOLUTION IRRIG 1000ML STRL H2O USP PLAS POUR BTL

## (undated) DEVICE — ELECTRODE ELECSURG MONOPOLAR 5 MMX45 CM L HK TIP EZ CLN

## (undated) DEVICE — 1200CC GUARDIAN II: Brand: GUARDIAN

## (undated) DEVICE — LAPAROSCOPIC SCISSORS: Brand: EPIX LAPAROSCOPIC SCISSORS

## (undated) DEVICE — TOWEL,OR,DSP,ST,BLUE,STD,4/PK,20PK/CS: Brand: MEDLINE

## (undated) DEVICE — GOWN,SIRUS,NONRNF,SETINSLV,XL,20/CS: Brand: MEDLINE

## (undated) DEVICE — TROCAR: Brand: KII FIOS FIRST ENTRY

## (undated) DEVICE — TROCAR: Brand: KII SLEEVE

## (undated) DEVICE — SOLUTION IRRIG 3000ML 0.9% SOD CHL USP UROMATIC PLAS CONT

## (undated) DEVICE — SUT MONOCRYL PLUS UD 4-0 --

## (undated) DEVICE — SYR 10ML LUER LOK 1/5ML GRAD --

## (undated) DEVICE — REM POLYHESIVE ADULT PATIENT RETURN ELECTRODE: Brand: VALLEYLAB

## (undated) DEVICE — INTENDED FOR TISSUE SEPARATION, AND OTHER PROCEDURES THAT REQUIRE A SHARP SURGICAL BLADE TO PUNCTURE OR CUT.: Brand: BARD-PARKER ® DISPOSABLE SCALPELS

## (undated) DEVICE — SPONGE GZ 4X4 IN 16-PLY DETECTABLE W/ DMT MSTR TAG

## (undated) DEVICE — DRAPE,LAP,CHOLE,W/TROUGHS,STERILE: Brand: MEDLINE

## (undated) DEVICE — GLOVE ORANGE PI 7 1/2   MSG9075

## (undated) DEVICE — ROCKER SWITCH PENCIL BLADE ELECTRODE, HOLSTER: Brand: EDGE

## (undated) DEVICE — SMARTSLEEVE SURGICAL GOWN, 3XL LONG: Brand: CONVERTORS

## (undated) DEVICE — PREP SKN CHLRAPRP APL 26ML STR --

## (undated) DEVICE — MARKER SKN REG TIP W/RULER -- STRL

## (undated) DEVICE — TUBING INSUF 0.3UM FLTR W/ LUERLOCK CONN

## (undated) DEVICE — SYRINGE BLB 60 CC TIP PROTECTOR STRL LF

## (undated) DEVICE — SMARTGOWN SURGICAL GOWN, LARGE: Brand: CONVERTORS

## (undated) DEVICE — PACK,SET-UP: Brand: MEDLINE

## (undated) DEVICE — VISUALIZATION SYSTEM: Brand: CLEARIFY

## (undated) DEVICE — APPLIER CLP M/L SHFT DIA5MM 15 LIG LIGAMAX 5

## (undated) DEVICE — DERMABOND SKIN ADH 0.7ML -- DERMABOND ADVANCED 12/BX

## (undated) DEVICE — BAG SPEC REM 224ML W4XL6IN DIA10MM 1 HND GYN DISP ENDOPCH

## (undated) DEVICE — DEVON TUBE HOLDER FIXED TOUCH FASTEN STRAP: Brand: DEVON

## (undated) DEVICE — HYPODERMIC SAFETY NEEDLE: Brand: MAGELLAN

## (undated) DEVICE — MAGNETIC INSTR DRAPE 20X16: Brand: MEDLINE INDUSTRIES, INC.

## (undated) DEVICE — 3M™ TEGADERM™ +PAD FILM DRESSING WITH NON-ADHERENT PAD, 3586, 3-1/2 IN X 4 IN (9 CM X 10 CM), 25/CAR, 4 CAR/CS: Brand: 3M™ TEGADERM™

## (undated) DEVICE — TROCARS: Brand: KII® BALLOON BLUNT TIP SYSTEM

## (undated) DEVICE — COVER,MAYO STAND,STERILE: Brand: MEDLINE

## (undated) DEVICE — HYPODERMIC SAFETY NEEDLE: Brand: MONOJECT

## (undated) DEVICE — FLOVAC HANDCONTROLLED SUCTION/IRRIGATION: Brand: FLOVAC

## (undated) DEVICE — 3M™ SURGICAL CLIPPER PROFESSIONAL BLADE 9680: Brand: 3M™